# Patient Record
Sex: FEMALE | Race: WHITE | Employment: FULL TIME | URBAN - METROPOLITAN AREA
[De-identification: names, ages, dates, MRNs, and addresses within clinical notes are randomized per-mention and may not be internally consistent; named-entity substitution may affect disease eponyms.]

---
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
## 2021-01-12 PROCEDURE — 99284 EMERGENCY DEPT VISIT MOD MDM: CPT

## 2021-01-12 PROCEDURE — 96372 THER/PROPH/DIAG INJ SC/IM: CPT

## 2021-01-12 RX ORDER — KETOROLAC TROMETHAMINE 30 MG/ML
15 INJECTION, SOLUTION INTRAMUSCULAR; INTRAVENOUS ONCE
Status: COMPLETED | OUTPATIENT
Start: 2021-01-12 | End: 2021-01-13

## 2021-01-12 RX ORDER — HYDROXYZINE PAMOATE 25 MG/1
50 CAPSULE ORAL ONCE
Status: COMPLETED | OUTPATIENT
Start: 2021-01-12 | End: 2021-01-13

## 2021-01-12 ASSESSMENT — PAIN SCALES - GENERAL: PAINLEVEL_OUTOF10: 7

## 2021-01-13 ENCOUNTER — HOSPITAL ENCOUNTER (EMERGENCY)
Age: 21
Discharge: HOME | End: 2021-01-13
Payer: COMMERCIAL

## 2021-01-13 VITALS
TEMPERATURE: 97.3 F | SYSTOLIC BLOOD PRESSURE: 124 MMHG | RESPIRATION RATE: 18 BRPM | DIASTOLIC BLOOD PRESSURE: 62 MMHG | HEART RATE: 92 BPM | OXYGEN SATURATION: 98 %

## 2021-01-13 DIAGNOSIS — R52 BODY ACHES: Primary | ICD-10-CM

## 2021-01-13 PROCEDURE — 6370000000 HC RX 637 (ALT 250 FOR IP): Performed by: EMERGENCY MEDICINE

## 2021-01-13 PROCEDURE — 6360000002 HC RX W HCPCS: Performed by: EMERGENCY MEDICINE

## 2021-01-13 RX ORDER — KETOROLAC TROMETHAMINE 10 MG/1
10 TABLET, FILM COATED ORAL EVERY 6 HOURS PRN
Qty: 20 TABLET | Refills: 0 | OUTPATIENT
Start: 2021-01-13 | End: 2021-09-11

## 2021-01-13 RX ORDER — HYDROXYZINE PAMOATE 25 MG/1
25 CAPSULE ORAL DAILY
Qty: 20 CAPSULE | Refills: 0 | Status: SHIPPED | OUTPATIENT
Start: 2021-01-13 | End: 2021-01-27

## 2021-01-13 RX ADMIN — KETOROLAC TROMETHAMINE 15 MG: 30 INJECTION, SOLUTION INTRAMUSCULAR; INTRAVENOUS at 00:43

## 2021-01-13 RX ADMIN — HYDROXYZINE PAMOATE 50 MG: 25 CAPSULE ORAL at 00:43

## 2021-01-13 ASSESSMENT — PAIN SCALES - GENERAL: PAINLEVEL_OUTOF10: 7

## 2021-01-13 NOTE — LETTER
[facility] Emergency Department  [address] [id number]  Phone: [phone]  Fax: [phone]               January 13, 2021    Patient: Rylnad Garcia   YOB: 2000   Date of Visit: 1/12/2021       To Whom It May Concern:    London Conway was seen and treated in our emergency department on 1/12/2021. She may return to work on 1/15/2021.       Sincerely,               Signature:__________________________________
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
## 2021-01-29 ENCOUNTER — HOSPITAL ENCOUNTER (EMERGENCY)
Age: 21
Discharge: HOME | End: 2021-01-29
Payer: COMMERCIAL

## 2021-01-29 VITALS
DIASTOLIC BLOOD PRESSURE: 78 MMHG | WEIGHT: 293 LBS | SYSTOLIC BLOOD PRESSURE: 127 MMHG | RESPIRATION RATE: 24 BRPM | HEART RATE: 77 BPM | BODY MASS INDEX: 50.02 KG/M2 | OXYGEN SATURATION: 95 % | HEIGHT: 64 IN | TEMPERATURE: 97.8 F

## 2021-01-29 DIAGNOSIS — R51.9 NONINTRACTABLE HEADACHE, UNSPECIFIED CHRONICITY PATTERN, UNSPECIFIED HEADACHE TYPE: Primary | ICD-10-CM

## 2021-01-29 DIAGNOSIS — Z04.9 SUSPECTED CONDITION NOT FOUND: ICD-10-CM

## 2021-01-29 LAB
ALBUMIN SERPL-MCNC: 4.3 GM/DL (ref 3.4–5)
ALP BLD-CCNC: 64 IU/L (ref 40–129)
ALT SERPL-CCNC: 35 U/L (ref 10–40)
ANION GAP SERPL CALCULATED.3IONS-SCNC: 10 MMOL/L (ref 4–16)
AST SERPL-CCNC: 18 IU/L (ref 15–37)
BACTERIA: NEGATIVE /HPF
BASOPHILS ABSOLUTE: 0 K/CU MM
BASOPHILS RELATIVE PERCENT: 0.4 % (ref 0–1)
BILIRUB SERPL-MCNC: 0.5 MG/DL (ref 0–1)
BILIRUBIN URINE: NEGATIVE MG/DL
BLOOD, URINE: NEGATIVE
BUN BLDV-MCNC: 9 MG/DL (ref 6–23)
CALCIUM SERPL-MCNC: 9.3 MG/DL (ref 8.3–10.6)
CHLORIDE BLD-SCNC: 101 MMOL/L (ref 99–110)
CLARITY: ABNORMAL
CO2: 26 MMOL/L (ref 21–32)
COLOR: ABNORMAL
CREAT SERPL-MCNC: 0.8 MG/DL (ref 0.6–1.1)
DIFFERENTIAL TYPE: ABNORMAL
EOSINOPHILS ABSOLUTE: 0.2 K/CU MM
EOSINOPHILS RELATIVE PERCENT: 2.1 % (ref 0–3)
GFR AFRICAN AMERICAN: >60 ML/MIN/1.73M2
GFR NON-AFRICAN AMERICAN: >60 ML/MIN/1.73M2
GLUCOSE BLD-MCNC: 155 MG/DL (ref 70–99)
GLUCOSE BLD-MCNC: 95 MG/DL (ref 70–99)
GLUCOSE, URINE: NEGATIVE MG/DL
HCG QUALITATIVE: NEGATIVE
HCT VFR BLD CALC: 45 % (ref 37–47)
HEMOGLOBIN: 14.1 GM/DL (ref 12.5–16)
IMMATURE NEUTROPHIL %: 0.1 % (ref 0–0.43)
KETONES, URINE: NEGATIVE MG/DL
LEUKOCYTE ESTERASE, URINE: ABNORMAL
LYMPHOCYTES ABSOLUTE: 3.7 K/CU MM
LYMPHOCYTES RELATIVE PERCENT: 36.9 % (ref 25–45)
MCH RBC QN AUTO: 27.5 PG (ref 27–31)
MCHC RBC AUTO-ENTMCNC: 31.3 % (ref 32–36)
MCV RBC AUTO: 87.7 FL (ref 78–100)
MONOCYTES ABSOLUTE: 0.7 K/CU MM
MONOCYTES RELATIVE PERCENT: 7.2 % (ref 0–4)
MUCUS: ABNORMAL HPF
NITRITE URINE, QUANTITATIVE: NEGATIVE
NUCLEATED RBC %: 0 %
PDW BLD-RTO: 13.5 % (ref 11.7–14.9)
PH, URINE: 6 (ref 5–8)
PLATELET # BLD: 267 K/CU MM (ref 140–440)
PMV BLD AUTO: 12.3 FL (ref 7.5–11.1)
POTASSIUM SERPL-SCNC: 3.8 MMOL/L (ref 3.5–5.1)
PROTEIN UA: 100 MG/DL
RBC # BLD: 5.13 M/CU MM (ref 4.2–5.4)
RBC URINE: 6 /HPF (ref 0–6)
SEGMENTED NEUTROPHILS ABSOLUTE COUNT: 5.3 K/CU MM
SEGMENTED NEUTROPHILS RELATIVE PERCENT: 53.3 % (ref 34–64)
SODIUM BLD-SCNC: 137 MMOL/L (ref 135–145)
SPECIFIC GRAVITY UA: 1.03 (ref 1–1.03)
SQUAMOUS EPITHELIAL: 94 /HPF
TOTAL IMMATURE NEUTOROPHIL: 0.01 K/CU MM
TOTAL NUCLEATED RBC: 0 K/CU MM
TOTAL PROTEIN: 7.2 GM/DL (ref 6.4–8.2)
TRICHOMONAS: ABNORMAL /HPF
UROBILINOGEN, URINE: NORMAL MG/DL (ref 0.2–1)
WBC # BLD: 10 K/CU MM (ref 4–10.5)
WBC UA: 70 /HPF (ref 0–5)

## 2021-01-29 PROCEDURE — 82962 GLUCOSE BLOOD TEST: CPT

## 2021-01-29 PROCEDURE — 96375 TX/PRO/DX INJ NEW DRUG ADDON: CPT

## 2021-01-29 PROCEDURE — 80053 COMPREHEN METABOLIC PANEL: CPT

## 2021-01-29 PROCEDURE — 85025 COMPLETE CBC W/AUTO DIFF WBC: CPT

## 2021-01-29 PROCEDURE — 87086 URINE CULTURE/COLONY COUNT: CPT

## 2021-01-29 PROCEDURE — 84703 CHORIONIC GONADOTROPIN ASSAY: CPT

## 2021-01-29 PROCEDURE — 99284 EMERGENCY DEPT VISIT MOD MDM: CPT

## 2021-01-29 PROCEDURE — 81001 URINALYSIS AUTO W/SCOPE: CPT

## 2021-01-29 PROCEDURE — 6360000002 HC RX W HCPCS: Performed by: PHYSICIAN ASSISTANT

## 2021-01-29 PROCEDURE — 96374 THER/PROPH/DIAG INJ IV PUSH: CPT

## 2021-01-29 PROCEDURE — 2580000003 HC RX 258: Performed by: PHYSICIAN ASSISTANT

## 2021-01-29 RX ORDER — DIPHENHYDRAMINE HYDROCHLORIDE 50 MG/ML
50 INJECTION INTRAMUSCULAR; INTRAVENOUS ONCE
Status: COMPLETED | OUTPATIENT
Start: 2021-01-29 | End: 2021-01-29

## 2021-01-29 RX ORDER — METOCLOPRAMIDE HYDROCHLORIDE 5 MG/ML
10 INJECTION INTRAMUSCULAR; INTRAVENOUS ONCE
Status: COMPLETED | OUTPATIENT
Start: 2021-01-29 | End: 2021-01-29

## 2021-01-29 RX ORDER — 0.9 % SODIUM CHLORIDE 0.9 %
1000 INTRAVENOUS SOLUTION INTRAVENOUS ONCE
Status: COMPLETED | OUTPATIENT
Start: 2021-01-29 | End: 2021-01-29

## 2021-01-29 RX ADMIN — METOCLOPRAMIDE 10 MG: 5 INJECTION, SOLUTION INTRAMUSCULAR; INTRAVENOUS at 20:39

## 2021-01-29 RX ADMIN — SODIUM CHLORIDE 1000 ML: 9 INJECTION, SOLUTION INTRAVENOUS at 20:39

## 2021-01-29 RX ADMIN — DIPHENHYDRAMINE HYDROCHLORIDE 50 MG: 50 INJECTION INTRAMUSCULAR; INTRAVENOUS at 20:40

## 2021-01-30 NOTE — ED NOTES
Pt. Presents to the ER with lightheadness, dizziness, and blurry vision. Pt. States she is not diabetic and has never had anything like this happen before.      [facility]  01/29/21 1947

## 2021-01-30 NOTE — ED PROVIDER NOTES
Emergency [address] EMERGENCY DEPARTMENT    Patient: Debbie Livingston  MRN: [id number]  : 2000  Date of Evaluation: 2021  ED Provider: Dimas Ramirez PA-C    Chief Complaint       Chief Complaint   Patient presents with    Hyperglycemia       Maurice Perez is a 24 y.o. female who presents to the emergency department for a headache, lightheadedness, blurred vision. Onset was this morning. Constant since onset. Headache is generalized, gradual onset, not thunderclap, not worst headache of life. She denies any nausea or vomiting. Denies any urinary symptoms. She had gone to Urgent Care and was told their glucometer read \"error-high\" and that they couldn't do anything for her. She states she went home and talked to her dad who is a diabetic and asked him what he would do and he suggested coming here because her sugar could be >500. She has no personal history of DM. Patient is asking if she's ready to leave upon initial exam.    ROS     CONSTITUTIONAL:  Denies fever. EYES:  Denies visual changes. HEAD:  +  headache. ENT:  Denies earache, nasal congestion, sore throat. NECK:  Denies neck pain. RESPIRATORY:  Denies any shortness of breath. CARDIOVASCULAR:  Denies chest pain. GI:  Denies nausea or vomiting. :  Denies urinary symptoms. MUSCULOSKELETAL:  Denies extremity pain or swelling. BACK:  Denies back pain. INTEGUMENT:  Denies skin changes. LYMPHATIC:  Denies lymphadenopathy. NEUROLOGIC:  Denies any numbness/tingling. PSYCHIATRIC:  Denies SI/HI. Past History     Past Medical History:   Diagnosis Date    ADHD (attention deficit hyperactivity disorder)     ADHD (attention deficit hyperactivity disorder)     Reflux      History reviewed. No pertinent surgical history.   Social History     Socioeconomic History    Marital status: Single     Spouse name: None    Number of children: None    Years of education: None    Highest education level: None   Occupational History    None   Social Needs    Financial resource strain: None    Food insecurity     Worry: None     Inability: None    Transportation needs     Medical: None     Non-medical: None   Tobacco Use    Smoking status: Current Every Day Smoker     Packs/day: 0.50    Smokeless tobacco: Current User     Types: Chew   Substance and Sexual Activity    Alcohol use: Yes     Comment: occasional    Drug use: Yes     Types: Marijuana    Sexual activity: None   Lifestyle    Physical activity     Days per week: None     Minutes per session: None    Stress: None   Relationships    Social connections     Talks on phone: None     Gets together: None     Attends [employer] service: None     Active member of club or organization: None     Attends meetings of clubs or organizations: None     Relationship status: None    Intimate partner violence     Fear of current or ex partner: None     Emotionally abused: None     Physically abused: None     Forced sexual activity: None   Other Topics Concern    None   Social History Narrative    None       Medications/Allergies     Previous Medications    AMPHETAMINE-DEXTROAMPHETAMINE (ADDERALL) 30 MG TABLET    Take 30 mg by mouth daily. AMPHETAMINE-DEXTROAMPHETAMINE (ADDERALL, 10MG,) 10 MG TABLET    Take 10 mg by mouth daily. CETIRIZINE (ZYRTEC ALLERGY) 10 MG TABLET    Take 10 mg by mouth daily. FLUOXETINE (PROZAC) 20 MG CAPSULE    Take 40 mg by mouth daily    KETOROLAC (TORADOL) 10 MG TABLET    Take 1 tablet by mouth every 6 hours as needed for Pain    LIDOCAINE (LIDODERM) 5 %    Place 1 patch onto the skin daily 12 hours on, 12 hours off. LORATADINE 10 MG CAPS    Take  by mouth. MELATONIN 3-10 MG TABS    Take  by mouth.       NAPROXEN (NAPROSYN) 500 MG TABLET    Take 1 tablet by mouth 2 times daily    ONDANSETRON (ZOFRAN) 4 MG TABLET    Take 1 tablet by mouth every 8 hours as needed for Nausea SULFAMETHOXAZOLE-TRIMETHOPRIM (BACTRIM DS) 800-160 MG PER TABLET    2 tabs by mouth twice a day x10 days. #40     Allergies   Allergen Reactions    Midol [Acetaminophen] Shortness Of Breath and Rash    Pollen Extract Anaphylaxis     sneezing    Cefazolin         Physical Exam       ED Triage Vitals [01/29/21 1905]   BP Temp Temp Source Heart Rate Resp SpO2 Height Weight - Scale   (!) 132/99 97.8 °F (36.6 °C) Oral (!) 110 16 96 % 5' 4\" (1.626 m) (!) 324 lb (147 kg)     GENERAL APPEARANCE:  Well-developed, well-nourished, no acute distress. HEAD:  NC/AT. EYES:  Sclera anicteric. ENT:  Ears, nose, mouth normal.     NECK:  Supple. CARDIO:  RRR. LUNGS:   CTAB. Respirations unlabored. ABDOMEN:  Soft, non-distended, non-tender. BS active. EXTREMITIES:  No acute deformities. SKIN:  Warm and dry. NEUROLOGICAL:  Alert and oriented. PSYCHIATRIC:  Normal mood. Diagnostics     Labs:    Labs Reviewed   CBC WITH AUTO DIFFERENTIAL - Abnormal; Notable for the following components:       Result Value    MCHC 31.3 (*)     MPV 12.3 (*)     Monocytes % 7.2 (*)     All other components within normal limits   URINALYSIS - Abnormal; Notable for the following components:    Color, UA HARRY (*)     Clarity, UA CLOUDY (*)     Protein,  (*)     Leukocyte Esterase, Urine MODERATE (*)     WBC, UA 70 (*)     Mucus, UA MANY (*)     All other components within normal limits   POCT GLUCOSE - Abnormal; Notable for the following components:    POC Glucose 155 (*)     All other components within normal limits   CULTURE, URINE   COMPREHENSIVE METABOLIC PANEL   HCG, SERUM, QUALITATIVE       ED Course and MDM   -  Patient seen and evaluated in the emergency department. -  Triage and nursing notes reviewed and incorporated. -  Old chart records reviewed and incorporated. -  Supervising physician was Dr. Son Samuels. Patient was seen independently.   -  Work-up included:  See above  -  ED medications:  NS, Reglan, Benadryl  -

## 2021-01-31 LAB
CULTURE: NORMAL
Lab: NORMAL
SPECIMEN: NORMAL

## 2021-07-16 ENCOUNTER — APPOINTMENT (OUTPATIENT)
Dept: GENERAL RADIOLOGY | Age: 21
End: 2021-07-16
Payer: COMMERCIAL

## 2021-07-16 ENCOUNTER — HOSPITAL ENCOUNTER (EMERGENCY)
Age: 21
Discharge: HOME | End: 2021-07-16
Payer: COMMERCIAL

## 2021-07-16 VITALS
BODY MASS INDEX: 50.02 KG/M2 | TEMPERATURE: 98.7 F | WEIGHT: 293 LBS | DIASTOLIC BLOOD PRESSURE: 75 MMHG | HEIGHT: 64 IN | HEART RATE: 111 BPM | OXYGEN SATURATION: 98 % | SYSTOLIC BLOOD PRESSURE: 126 MMHG | RESPIRATION RATE: 18 BRPM

## 2021-07-16 DIAGNOSIS — F17.200 SMOKER: ICD-10-CM

## 2021-07-16 DIAGNOSIS — J45.41 MODERATE PERSISTENT ASTHMA WITH EXACERBATION: Primary | ICD-10-CM

## 2021-07-16 PROCEDURE — 6370000000 HC RX 637 (ALT 250 FOR IP): Performed by: PHYSICIAN ASSISTANT

## 2021-07-16 PROCEDURE — 94640 AIRWAY INHALATION TREATMENT: CPT

## 2021-07-16 PROCEDURE — 99283 EMERGENCY DEPT VISIT LOW MDM: CPT

## 2021-07-16 PROCEDURE — 6360000002 HC RX W HCPCS: Performed by: PHYSICIAN ASSISTANT

## 2021-07-16 PROCEDURE — 71045 X-RAY EXAM CHEST 1 VIEW: CPT

## 2021-07-16 RX ORDER — IPRATROPIUM BROMIDE AND ALBUTEROL SULFATE 2.5; .5 MG/3ML; MG/3ML
1 SOLUTION RESPIRATORY (INHALATION) ONCE
Status: COMPLETED | OUTPATIENT
Start: 2021-07-16 | End: 2021-07-16

## 2021-07-16 RX ORDER — ALBUTEROL SULFATE 90 UG/1
1-2 AEROSOL, METERED RESPIRATORY (INHALATION) EVERY 6 HOURS PRN
Qty: 1 INHALER | Refills: 0 | Status: SHIPPED | OUTPATIENT
Start: 2021-07-16 | End: 2021-09-11

## 2021-07-16 RX ORDER — ALBUTEROL SULFATE 2.5 MG/3ML
2.5 SOLUTION RESPIRATORY (INHALATION) ONCE
Status: COMPLETED | OUTPATIENT
Start: 2021-07-16 | End: 2021-07-16

## 2021-07-16 RX ORDER — PREDNISONE 10 MG/1
40 TABLET ORAL DAILY
Qty: 20 TABLET | Refills: 0 | Status: SHIPPED | OUTPATIENT
Start: 2021-07-16 | End: 2021-07-21

## 2021-07-16 RX ADMIN — PREDNISONE 30 MG: 20 TABLET ORAL at 19:08

## 2021-07-16 RX ADMIN — ALBUTEROL SULFATE 2.5 MG: 2.5 SOLUTION RESPIRATORY (INHALATION) at 19:04

## 2021-07-16 RX ADMIN — IPRATROPIUM BROMIDE AND ALBUTEROL SULFATE 1 AMPULE: .5; 3 SOLUTION RESPIRATORY (INHALATION) at 19:04

## 2021-07-16 RX ADMIN — IPRATROPIUM BROMIDE AND ALBUTEROL SULFATE 1 AMPULE: .5; 2.5 SOLUTION RESPIRATORY (INHALATION) at 18:31

## 2021-07-16 RX ADMIN — ALBUTEROL SULFATE 2.5 MG: 2.5 SOLUTION RESPIRATORY (INHALATION) at 18:31

## 2021-07-16 ASSESSMENT — PAIN SCALES - GENERAL: PAINLEVEL_OUTOF10: 8

## 2021-07-16 ASSESSMENT — PAIN DESCRIPTION - PAIN TYPE: TYPE: ACUTE PAIN

## 2021-07-16 ASSESSMENT — PAIN DESCRIPTION - LOCATION: LOCATION: CHEST

## 2021-07-16 NOTE — ED PROVIDER NOTES
[address] ENCOUNTER        Pt Name: Noble Infante  MRN: [id number]  [place] 2000  Date of evaluation: 7/16/2021  Provider: Brandy Graf PA-C  PCP: Sabina Patel MD  Note Started: 6:25 PM EDT       DEISY. I have evaluated this patient. My supervising physician was available for consultation. Sharyn Kim MD      CHIEF COMPLAINT       Chief Complaint   Patient presents with    Cough     states she startes have cough and congestion approx. 2 days ago. denies fever. HISTORY OF PRESENT ILLNESS   (Location, Timing/Onset, Context/Setting, Quality, Duration, Modifying Factors, Severity, Associated Signs and Symptoms)  Note limiting factors. Chief Complaint: Shortness of breath, cough    Noble Infante is a 24 y.o. female who presents presents with wheezing, shortness of breath and cough. Patient has history of asthma. She has not seen a healthcare provider in years. She does not recall using HFA in the past.  Patient has no known seasonal allergies. The patient does report symptoms beginning about 3 days ago worse over the past 48 hours. Worse today. She comes in with audible wheezing and a wheezy cough. The patient does smoke times years. Typically 1 pack/day. She is unable to smoke for the past couple of days. Denies fevers, chills, sputum production. No gastrointestinal or urinary complaints. Nursing Notes were all reviewed and agreed with or any disagreements were addressed in the HPI. REVIEW OF SYSTEMS    (2-9 systems for level 4, 10 or more for level 5)     Review of Systems    Positives and Pertinent negatives as per HPI. Except as noted above in the ROS, all other systems were reviewed and negative.        PAST MEDICAL HISTORY     Past Medical History:   Diagnosis Date    ADHD (attention deficit hyperactivity disorder)     ADHD (attention deficit hyperactivity disorder)     Head: Normocephalic and atraumatic. Right Ear: Tympanic membrane, ear canal and external ear normal.      Left Ear: Tympanic membrane, ear canal and external ear normal.      Nose: Congestion present. Mouth/Throat:      Mouth: Mucous membranes are moist.      Pharynx: Oropharynx is clear. Eyes:      General: No scleral icterus. Right eye: No discharge. Left eye: No discharge. Conjunctiva/sclera: Conjunctivae normal.   Cardiovascular:      Rate and Rhythm: Regular rhythm. Tachycardia present. Heart sounds: Normal heart sounds. Pulmonary:      Effort: Pulmonary effort is normal.      Breath sounds: Wheezing present. Musculoskeletal:         General: Normal range of motion. Cervical back: Normal range of motion and neck supple. Skin:     General: Skin is warm and dry. Neurological:      General: No focal deficit present. Mental Status: She is alert and oriented to person, place, and time. Mental status is at baseline. Psychiatric:         Mood and Affect: Mood normal.         Behavior: Behavior normal.         Thought Content: Thought content normal.         Judgment: Judgment normal.         DIAGNOSTIC RESULTS   LABS:    Labs Reviewed - No data to display    When ordered only abnormal lab results are displayed. All other labs were within normal range or not returned as of this dictation. EKG: When ordered, EKG's are interpreted by the Emergency Department Physician in the absence of a cardiologist.  Please see their note for interpretation of EKG. RADIOLOGY:   Non-plain film images such as CT, Ultrasound and MRI are read by the radiologist. Plain radiographic images are visualized and preliminarily interpreted by the ED Provider with the below findings:        Interpretation per the Radiologist below, if available at the time of this note:    XR CHEST PORTABLE   Final Result   No acute process.            XR CHEST PORTABLE    Result Date: discharge. I did write for a Ventolin inhaler. I did recommend she decrease smoking by 50% for least 1 to 2 weeks and then discontinue smoking. I have placed referral to family practice service. The patient did express understanding the diagnosis and the treatment plan. FINAL IMPRESSION      1. Moderate persistent asthma with exacerbation    2. Smoker          DISPOSITION/PLAN   DISPOSITION Decision To Discharge 07/16/2021 07:09:58 PM      PATIENT REFERRED TO:  [facility]  [address]  [address] [facility] [address]  [phone]  Schedule an appointment as soon as possible for a visit on 7/21/2021      [facility] - ADULT  [address]  [phone]  Schedule an appointment as soon as possible for a visit on 7/21/2021      [facility] Emergency Department  [address] [facility] [id number]  [phone]  Go to   If symptoms worsen      DISCHARGE MEDICATIONS:  New Prescriptions    ALBUTEROL SULFATE  (90 BASE) MCG/ACT INHALER    Inhale 1-2 puffs into the lungs every 6 hours as needed for Wheezing    PREDNISONE (DELTASONE) 10 MG TABLET    Take 4 tablets by mouth daily for 5 doses       DISCONTINUED MEDICATIONS:  Discontinued Medications    No medications on file              (Please note that portions of this note were completed with a voice recognition program.  Efforts were made to edit the dictations but occasionally words are mis-transcribed. )    Ihsan Parker PA-C (electronically signed)           Ihsan Parker PA-C  07/16/21 [id number]

## 2021-08-02 ENCOUNTER — HOSPITAL ENCOUNTER (EMERGENCY)
Age: 21
Discharge: HOME | End: 2021-08-02
Payer: COMMERCIAL

## 2021-08-02 VITALS
DIASTOLIC BLOOD PRESSURE: 59 MMHG | WEIGHT: 293 LBS | BODY MASS INDEX: 50.02 KG/M2 | TEMPERATURE: 99.4 F | SYSTOLIC BLOOD PRESSURE: 109 MMHG | HEART RATE: 101 BPM | HEIGHT: 64 IN | RESPIRATION RATE: 18 BRPM | OXYGEN SATURATION: 97 %

## 2021-08-02 DIAGNOSIS — Z11.52 ENCOUNTER FOR SCREENING FOR COVID-19: Primary | ICD-10-CM

## 2021-08-02 PROCEDURE — U0003 INFECTIOUS AGENT DETECTION BY NUCLEIC ACID (DNA OR RNA); SEVERE ACUTE RESPIRATORY SYNDROME CORONAVIRUS 2 (SARS-COV-2) (CORONAVIRUS DISEASE [COVID-19]), AMPLIFIED PROBE TECHNIQUE, MAKING USE OF HIGH THROUGHPUT TECHNOLOGIES AS DESCRIBED BY CMS-2020-01-R: HCPCS

## 2021-08-02 PROCEDURE — 99283 EMERGENCY DEPT VISIT LOW MDM: CPT

## 2021-08-02 PROCEDURE — U0005 INFEC AGEN DETEC AMPLI PROBE: HCPCS

## 2021-08-02 PROCEDURE — 6370000000 HC RX 637 (ALT 250 FOR IP): Performed by: PHYSICIAN ASSISTANT

## 2021-08-02 RX ORDER — IBUPROFEN 600 MG/1
600 TABLET ORAL ONCE
Status: COMPLETED | OUTPATIENT
Start: 2021-08-02 | End: 2021-08-02

## 2021-08-02 RX ADMIN — IBUPROFEN 600 MG: 600 TABLET ORAL at 21:24

## 2021-08-02 ASSESSMENT — PAIN DESCRIPTION - PAIN TYPE: TYPE: ACUTE PAIN

## 2021-08-02 ASSESSMENT — PAIN DESCRIPTION - LOCATION: LOCATION: HEAD

## 2021-08-02 ASSESSMENT — PAIN SCALES - GENERAL
PAINLEVEL_OUTOF10: 9
PAINLEVEL_OUTOF10: 7

## 2021-08-03 ENCOUNTER — CARE COORDINATION (OUTPATIENT)
Dept: CARE COORDINATION | Age: 21
End: 2021-08-03

## 2021-08-03 LAB
SARS-COV-2: NOT DETECTED
SOURCE: NORMAL

## 2021-08-03 NOTE — ED PROVIDER NOTES
EMERGENCY DEPARTMENT ENCOUNTER      PCP: Hector Hunt MD    CHIEF COMPLAINT    Chief Complaint   Patient presents with    Concern For COVID-19     This patient was not evaluated by the attending physician. I have independently evaluated this patient. My supervising physician was available for consultation. HPI    Bernard Esteban is a 24 y.o. female who presents to the emergency department today concerned about an exposure to COVID-19. States that she has had a mild headache with a throwing symptom. She states her exposure was 3 days ago that person has and has since tested positive. No chest pain or shortness of breath, no significant fevers. No congestion. States that employer wants a Covid test prior to returning back to work. Patient denies lower extremity swelling, recent long travel, history of PE/DVT, exogenous hormone use, recent surgeries/hospitalizations, recent trauma, hemoptysis. Patient denies fever, chills, nasal congestion, rhinorrhea, sore throat, ear pain, eye pain, drainage from ears or eyes, difficulty swallowing, shortness of breath, wheezing, hemoptysis, chest pain. REVIEW OF SYSTEMS    Constitutional:  no fever, chills  HEENT:  See HPI  Cardiovascular:  Denies chest pain, palpitations. Respiratory:  See HPI  GI:  Denies abdominal pain, vomiting, or diarrhea   Neurologic:  Denies confusion, light headedness, dizziness, or syncope   Skin:  No rash  All other review of systems are negative  See HPI and nursing notes for additional information     PAST MEDICAL AND SURGICAL HISTORY    Past Medical History:   Diagnosis Date    ADHD (attention deficit hyperactivity disorder)     ADHD (attention deficit hyperactivity disorder)     Reflux      History reviewed. No pertinent surgical history.     CURRENT MEDICATIONS    Current Outpatient Rx   Medication Sig Dispense Refill    albuterol sulfate  (90 Base) MCG/ACT inhaler Inhale 1-2 puffs into the lungs every 6 hours as needed for Wheezing 1 Inhaler 0    ketorolac (TORADOL) 10 MG tablet Take 1 tablet by mouth every 6 hours as needed for Pain 20 tablet 0    lidocaine (LIDODERM) 5 % Place 1 patch onto the skin daily 12 hours on, 12 hours off. 30 patch 0    naproxen (NAPROSYN) 500 MG tablet Take 1 tablet by mouth 2 times daily 60 tablet 0    ondansetron (ZOFRAN) 4 MG tablet Take 1 tablet by mouth every 8 hours as needed for Nausea 10 tablet 0    sulfamethoxazole-trimethoprim (BACTRIM DS) 800-160 MG per tablet 2 tabs by mouth twice a day x10 days. #40 40 tablet 0    FLUoxetine (PROZAC) 20 MG capsule Take 40 mg by mouth daily      Loratadine 10 MG CAPS Take  by mouth.  cetirizine (ZYRTEC ALLERGY) 10 MG tablet Take 10 mg by mouth daily.  amphetamine-dextroamphetamine (ADDERALL, 10MG,) 10 MG tablet Take 10 mg by mouth daily.  amphetamine-dextroamphetamine (ADDERALL) 30 MG tablet Take 30 mg by mouth daily.  Melatonin 3-10 MG TABS Take  by mouth. ALLERGIES    Allergies   Allergen Reactions    Midol [Acetaminophen] Shortness Of Breath and Rash    Pollen Extract Anaphylaxis     sneezing    Cefazolin        FAMILY AND SOCIAL HISTORY    History reviewed. No pertinent family history.   Social History     Socioeconomic History    Marital status: Single     Spouse name: None    Number of children: None    Years of education: None    Highest education level: None   Occupational History    None   Tobacco Use    Smoking status: Current Every Day Smoker     Packs/day: 0.50    Smokeless tobacco: Current User     Types: Chew   Substance and Sexual Activity    Alcohol use: Yes     Comment: occasional    Drug use: Yes     Types: Marijuana    Sexual activity: None   Other Topics Concern    None   Social History Narrative    None     Social Determinants of Health     Financial Resource Strain:     Difficulty of Paying Living Expenses:    Food Insecurity:     Worried About Running Out of Food in the Last Year:    [address] in the Last Year:    Transportation Needs:     Lack of Transportation (Medical):  Lack of Transportation (Non-Medical):    Physical Activity:     Days of Exercise per Week:     Minutes of Exercise per Session:    Stress:     Feeling of Stress :    Social Connections:     Frequency of Communication with Friends and Family:     Frequency of Social Gatherings with Friends and Family:     Attends Anabaptism Services:     Active Member of Clubs or Organizations:     Attends Club or Organization Meetings:     Marital Status:    Intimate Partner Violence:     Fear of Current or Ex-Partner:     Emotionally Abused:     Physically Abused:     Sexually Abused:      PHYSICAL EXAM    VITAL SIGNS: BP (!) 109/59   Pulse 101   Temp 99.4 °F (37.4 °C) (Oral)   Resp 18   Ht 5' 4\" (1.626 m)   Wt 300 lb (136.1 kg)   SpO2 97%   BMI 51.49 kg/m²   Constitutional:  Well developed, well nourished, no acute distress, non-toxic appearance   Eyes: Conjunctiva normal, sclera non-icteric  HEENT:     - Normocephalic, atraumatic   - EOM intact. Conjunctiva normal    - External ears and nose normal.   - External auditory canals clear   - TMs clear without discharge, fluid, or bulging.   - Nasal passages with mildly erythematous and edematous turbinates. No masses. - Oropharynx mildly erythematous without tonsillar hypertrophy or exudate. Uvula is midline and rises evenly with phonation. Neck/Lymphatics:    - supple, no JVD, no swollen nodes  Respiratory:    Clear to auscultation in bilateral lung fields, no wheezes/rales/rhonchi. No retractions, no accessory muscle use  Cardiovascular:  Normal rate, normal rhythm   GI:  Soft, no abdominal tenderness, no guarding. Musculoskeletal:  No obvious deficits, no edema   Integument:  Skin pink, warm, dry, intact     LABS:  No results found for this visit on 08/02/21.       RADIOLOGY/PROCEDURES    No orders to display     ED COURSE & MEDICAL DECISION MAKING      Vital signs and nursing notes reviewed during ED course. I have independently evaluated this patient. Supervising physician present in the Emergency Department, available for consultation, throughout entirety of  patient care. All pertinent Lab data and radiographic results reviewed with patient at bedside. Patient presents as above which prompted work up today. I did don/doff appropriate PPE as recommended by the health facility/national standard best practice, during my bedside interactions with the patient. I discussed the likely viral etiology of patient's symptoms with patient today and recommend symptomatic treatment with increase fluids and rest. I considered the possibility of COVID19 in light of the current available information and COVID19 testing in progress. I have explained to the patient that their condition may change rapidly and have given them strict return precautions. In addition, they are given instructions regarding appropriate symptomatic care at home and instructions on how to minimize spread of the infection. Out of an abundance of caution I told patient and household members to self quarantine, and patient is to self-isolate until COVID-19 testing results and is negative, or if positive until cleared by local health department. Patient should also self isolate and self quarantine until without fever for 24 hours without use of antipyretics, and be greater than 7 days from onset of illness. Patient is stable for outpatient management. The patient and/or the family were informed of the results of any tests/labs/imaging, the treatment plan, and time was allotted to answer questions. Diagnosis, disposition, and plan discussed in detail with patient who understands and agrees. Patient understands and agrees to follow up with PCP for recheck in 2-3 days.  Patient understands and agrees to return to emergency department if symptoms worsen or any new symptoms develop- strict return precautions given. Clinical  IMPRESSION    1. Encounter for screening for COVID-19      Disposition referral (if applicable):  [facility] Emergency Department  [address]  [phone]  In 2 days  For re- check, If symptoms worsen    Disposition medications (if applicable):  Discharge Medication List as of 8/2/2021  9:19 PM        Comment: Please note this report has been produced using speech recognition software and may contain errors related to that system including errors in grammar, punctuation, and spelling, as well as words and phrases that may be inappropriate. If there are any questions or concerns please feel free to contact the dictating provider for clarification.       Citlaly Reyna, PA  08/03/21 4291

## 2021-08-03 NOTE — CARE COORDINATION
Patient contacted regarding COVID-19 risk. Discussed COVID-19 related testing which was pending at this time. Test results were pending. Patient informed of results, if available? No.      Ambulatory Care Manager contacted the patient by telephone to perform post discharge assessment. Call within 2 business days of discharge: Yes. Verified name and  with patient as identifiers. Provided introduction to self, and explanation of the CTN/ACM role, and reason for call due to risk factors for infection and/or exposure to COVID-19. Symptoms reviewed with patient who verbalized the following symptoms: no new symptoms. Due to no new or worsening symptoms encounter was not routed to provider for escalation. Discussed follow-up appointments. If no appointment was previously scheduled, appointment scheduling offered: No.  [address] follow up appointment(s): No future appointments. Non-[facility] follow up appointment(s):     Non-face-to-face services provided:  Obtained and reviewed discharge summary and/or continuity of care documents     Advance Care Planning:   Does patient have an Advance Directive:  reviewed and current. Educated patient about risk for severe COVID-19 due to risk factors according to CDC guidelines. ACM reviewed discharge instructions, medical action plan and red flag symptoms with the patient who verbalized understanding. Discussed COVID vaccination status: Yes. Education provided on COVID-19 vaccination as appropriate. Discussed exposure protocols and quarantine with CDC Guidelines. Patient was given an opportunity to verbalize any questions and concerns and agrees to contact ACM or health care provider for questions related to their healthcare. Reviewed and educated patient on any new and changed medications related to discharge diagnosis     Was patient discharged with a pulse oximeter?  No Discussed and confirmed pulse oximeter discharge instructions and when to notify provider or seek emergency care. ACM provided contact information. No further follow-up call identified based on severity of symptoms and risk factors.

## 2021-09-11 ENCOUNTER — HOSPITAL ENCOUNTER (EMERGENCY)
Age: 21
Discharge: HOME | End: 2021-09-11
Payer: COMMERCIAL

## 2021-09-11 ENCOUNTER — APPOINTMENT (OUTPATIENT)
Dept: GENERAL RADIOLOGY | Age: 21
End: 2021-09-11
Payer: COMMERCIAL

## 2021-09-11 VITALS
TEMPERATURE: 98.7 F | OXYGEN SATURATION: 97 % | HEIGHT: 64 IN | BODY MASS INDEX: 50.02 KG/M2 | RESPIRATION RATE: 20 BRPM | WEIGHT: 293 LBS | SYSTOLIC BLOOD PRESSURE: 132 MMHG | DIASTOLIC BLOOD PRESSURE: 83 MMHG | HEART RATE: 99 BPM

## 2021-09-11 DIAGNOSIS — J20.9 BRONCHITIS, ACUTE, WITH BRONCHOSPASM: ICD-10-CM

## 2021-09-11 DIAGNOSIS — B34.0 ADENOVIRUS INFECTION: Primary | ICD-10-CM

## 2021-09-11 DIAGNOSIS — F17.200 SMOKER: ICD-10-CM

## 2021-09-11 LAB
ADENOVIRUS DETECTION BY PCR: ABNORMAL
BORDETELLA PARAPERTUSSIS BY PCR: NOT DETECTED
BORDETELLA PERTUSSIS PCR: NOT DETECTED
CHLAMYDOPHILA PNEUMONIA PCR: NOT DETECTED
CORONAVIRUS 229E PCR: NOT DETECTED
CORONAVIRUS HKU1 PCR: NOT DETECTED
CORONAVIRUS NL63 PCR: NOT DETECTED
CORONAVIRUS OC43 PCR: NOT DETECTED
EKG ATRIAL RATE: 105 BPM
EKG DIAGNOSIS: NORMAL
EKG P AXIS: 49 DEGREES
EKG P-R INTERVAL: 142 MS
EKG Q-T INTERVAL: 342 MS
EKG QRS DURATION: 74 MS
EKG QTC CALCULATION (BAZETT): 452 MS
EKG R AXIS: 28 DEGREES
EKG T AXIS: 18 DEGREES
EKG VENTRICULAR RATE: 105 BPM
HUMAN METAPNEUMOVIRUS PCR: NOT DETECTED
INFLUENZA A BY PCR: NOT DETECTED
INFLUENZA A H1 (2009) PCR: NOT DETECTED
INFLUENZA A H1 PANDEMIC PCR: NOT DETECTED
INFLUENZA A H3 PCR: NOT DETECTED
INFLUENZA B BY PCR: NOT DETECTED
MYCOPLASMA PNEUMONIAE PCR: NOT DETECTED
PARAINFLUENZA 1 PCR: NOT DETECTED
PARAINFLUENZA 2 PCR: NOT DETECTED
PARAINFLUENZA 3 PCR: NOT DETECTED
PARAINFLUENZA 4 PCR: NOT DETECTED
RHINOVIRUS ENTEROVIRUS PCR: NOT DETECTED
RSV PCR: NOT DETECTED
SARS-COV-2: NOT DETECTED

## 2021-09-11 PROCEDURE — 94640 AIRWAY INHALATION TREATMENT: CPT

## 2021-09-11 PROCEDURE — 93005 ELECTROCARDIOGRAM TRACING: CPT | Performed by: EMERGENCY MEDICINE

## 2021-09-11 PROCEDURE — 6370000000 HC RX 637 (ALT 250 FOR IP): Performed by: PHYSICIAN ASSISTANT

## 2021-09-11 PROCEDURE — 93010 ELECTROCARDIOGRAM REPORT: CPT | Performed by: INTERNAL MEDICINE

## 2021-09-11 PROCEDURE — 94664 DEMO&/EVAL PT USE INHALER: CPT

## 2021-09-11 PROCEDURE — 99285 EMERGENCY DEPT VISIT HI MDM: CPT

## 2021-09-11 PROCEDURE — 71045 X-RAY EXAM CHEST 1 VIEW: CPT

## 2021-09-11 PROCEDURE — 0202U NFCT DS 22 TRGT SARS-COV-2: CPT

## 2021-09-11 RX ORDER — DEXTROMETHORPHAN HYDROBROMIDE AND PROMETHAZINE HYDROCHLORIDE 15; 6.25 MG/5ML; MG/5ML
5 SYRUP ORAL EVERY 6 HOURS PRN
Qty: 120 ML | Refills: 0 | Status: SHIPPED | OUTPATIENT
Start: 2021-09-11 | End: 2021-09-18

## 2021-09-11 RX ORDER — GUAIFENESIN/DEXTROMETHORPHAN 100-10MG/5
10 SYRUP ORAL ONCE
Status: COMPLETED | OUTPATIENT
Start: 2021-09-11 | End: 2021-09-11

## 2021-09-11 RX ORDER — ALBUTEROL SULFATE 90 UG/1
2 AEROSOL, METERED RESPIRATORY (INHALATION) ONCE
Status: COMPLETED | OUTPATIENT
Start: 2021-09-11 | End: 2021-09-11

## 2021-09-11 RX ORDER — GUAIFENESIN 600 MG/1
600 TABLET, EXTENDED RELEASE ORAL 2 TIMES DAILY
Qty: 30 TABLET | Refills: 0 | Status: SHIPPED | OUTPATIENT
Start: 2021-09-11 | End: 2021-09-26

## 2021-09-11 RX ORDER — ALBUTEROL SULFATE 90 UG/1
1-2 AEROSOL, METERED RESPIRATORY (INHALATION) EVERY 6 HOURS PRN
Qty: 18 G | Refills: 0 | Status: SHIPPED | OUTPATIENT
Start: 2021-09-11

## 2021-09-11 RX ORDER — PREDNISONE 10 MG/1
20 TABLET ORAL DAILY
Qty: 10 TABLET | Refills: 0 | Status: SHIPPED | OUTPATIENT
Start: 2021-09-11 | End: 2021-09-16

## 2021-09-11 RX ADMIN — GUAIFENESIN AND DEXTROMETHORPHAN 10 ML: 100; 10 SYRUP ORAL at 13:07

## 2021-09-11 RX ADMIN — Medication 2 PUFF: at 12:56

## 2021-09-11 RX ADMIN — ALBUTEROL SULFATE 2 PUFF: 90 AEROSOL, METERED RESPIRATORY (INHALATION) at 12:54

## 2021-09-11 NOTE — ED PROVIDER NOTES
[address] ENCOUNTER        Pt Name: Gaudencio Diaz  MRN: [id number]  [place] 2000  Date of evaluation: 9/11/2021  Provider: Ed Lovell PA-C  PCP: Brooklynn Mcduffie MD  Note Started: 10:42 AM EDT       DEISY. I have evaluated this patient. My supervising physician was available for consultation. Ruth Ford DO    CHIEF COMPLAINT       Chief Complaint   Patient presents with    Shortness of Breath       HISTORY OF PRESENT ILLNESS   (Location, Timing/Onset, Context/Setting, Quality, Duration, Modifying Factors, Severity, Associated Signs and Symptoms)  Note limiting factors. Chief Complaint: Wheezy cough    Gaudencio Diaz is a 24 y.o. female who presents with complaint of tightness in her chest and a wheezy cough. Known asthma and she does smoke. Out of her inhaler. Onset 2 days 3 days ago with progression. She has not had Covid vaccine. She did ask where could she got 1 and I responded any local pharmacy. She has been tested previously for Covid. She does work in healthcare. She describes no gastrointestinal or urinary complaints. Denies any fevers or chills. No change in taste or smell. No headache or myalgia. Nursing Notes were all reviewed and agreed with or any disagreements were addressed in the HPI. REVIEW OF SYSTEMS    (2-9 systems for level 4, 10 or more for level 5)     Review of Systems    Positives and Pertinent negatives as per HPI. Except as noted above in the ROS, all other systems were reviewed and negative. PAST MEDICAL HISTORY     Past Medical History:   Diagnosis Date    ADHD (attention deficit hyperactivity disorder)     ADHD (attention deficit hyperactivity disorder)     Reflux          SURGICAL HISTORY   History reviewed. No pertinent surgical history.       [address]       Discharge Medication List as of 9/11/2021  2:11 PM      CONTINUE these medications which have NOT CHANGED    Details   lidocaine (LIDODERM) 5 % Place 1 patch onto the skin daily 12 hours on, 12 hours off., Disp-30 patch,R-0Print      ondansetron (ZOFRAN) 4 MG tablet Take 1 tablet by mouth every 8 hours as needed for Nausea, Disp-10 tablet, R-0Print      FLUoxetine (PROZAC) 20 MG capsule Take 40 mg by mouth daily      Loratadine 10 MG CAPS Take  by mouth. cetirizine (ZYRTEC ALLERGY) 10 MG tablet Take 10 mg by mouth daily. amphetamine-dextroamphetamine (ADDERALL, 10MG,) 10 MG tablet Take 10 mg by mouth daily. amphetamine-dextroamphetamine (ADDERALL) 30 MG tablet Take 30 mg by mouth daily. Melatonin 3-10 MG TABS Take  by mouth. ALLERGIES     Midol [acetaminophen], Pollen extract, and Cefazolin    FAMILYHISTORY     History reviewed. No pertinent family history. SOCIAL HISTORY       Social History     Tobacco Use    Smoking status: Current Every Day Smoker     Packs/day: 0.50    Smokeless tobacco: Current User     Types: Chew   Substance Use Topics    Alcohol use: Yes     Comment: occasional    Drug use: Yes     Types: Marijuana       SCREENINGS    [place] Coma Scale  Eye Opening: Spontaneous  Best Verbal Response: Oriented  Best Motor Response: Obeys commands  [place] Coma Scale Score: 15        PHYSICAL EXAM    (up to 7 for level 4, 8 or more for level 5)     ED Triage Vitals   BP Temp Temp src Pulse Resp SpO2 Height Weight   -- -- -- -- -- -- -- --       Physical Exam  Vitals and nursing note reviewed. Constitutional:       Appearance: Normal appearance. She is well-developed. She is obese. HENT:      Head: Normocephalic and atraumatic. Right Ear: External ear normal.      Left Ear: External ear normal.      Mouth/Throat:      Mouth: Mucous membranes are moist.   Eyes:      General: No scleral icterus. Right eye: No discharge. Left eye: No discharge.       Conjunctiva/sclera: Conjunctivae normal.   Cardiovascular: Rate and Rhythm: Normal rate and regular rhythm. Heart sounds: Normal heart sounds. Pulmonary:      Effort: Pulmonary effort is normal.      Breath sounds: Wheezing present. Musculoskeletal:         General: Normal range of motion. Cervical back: Normal range of motion and neck supple. Right lower leg: No edema. Left lower leg: No edema. Skin:     General: Skin is warm and dry. Neurological:      General: No focal deficit present. Mental Status: She is alert and oriented to person, place, and time. Mental status is at baseline. Psychiatric:         Mood and Affect: Mood normal.         Behavior: Behavior normal.         Thought Content: Thought content normal.         Judgment: Judgment normal.         DIAGNOSTIC RESULTS   LABS:    Labs Reviewed   RESPIRATORY PANEL, MOLECULAR, WITH COVID-19 - Abnormal; Notable for the following components:       Result Value    Adenovirus Detection by PCR DETECTED BY PCR (*)     All other components within normal limits       When ordered only abnormal lab results are displayed. All other labs were within normal range or not returned as of this dictation. EKG: When ordered, EKG's are interpreted by the Emergency Department Physician in the absence of a cardiologist.  Please see their note for interpretation of EKG. RADIOLOGY:   Non-plain film images such as CT, Ultrasound and MRI are read by the radiologist. Plain radiographic images are visualized and preliminarily interpreted by the ED Provider with the below findings:        Interpretation per the Radiologist below, if available at the time of this note:    XR CHEST PORTABLE   Final Result   Limited exam with no definite pneumonia or pulmonary edema. No results found.         PROCEDURES   Unless otherwise noted below, none     Procedures    CRITICAL CARE TIME   N/A    CONSULTS:  None      EMERGENCY DEPARTMENT COURSE and DIFFERENTIAL DIAGNOSIS/MDM:   Vitals:    Vitals: promethazine-dextromethorphan (PROMETHAZINE-DM) 6.25-15 MG/5ML syrup Take 5 mLs by mouth every 6 hours as needed for Cough, Disp-120 mL, R-0Normal      guaiFENesin (MUCINEX) 600 MG extended release tablet Take 1 tablet by mouth 2 times daily for 15 days, Disp-30 tablet, R-0Normal             DISCONTINUED MEDICATIONS:  Discharge Medication List as of 9/11/2021  2:11 PM      STOP taking these medications       ketorolac (TORADOL) 10 MG tablet Comments:   Reason for Stopping:         naproxen (NAPROSYN) 500 MG tablet Comments:   Reason for Stopping:         sulfamethoxazole-trimethoprim (BACTRIM DS) 800-160 MG per tablet Comments:   Reason for Stopping:                      (Please note that portions of this note were completed with a voice recognition program.  Efforts were made to edit the dictations but occasionally words are mis-transcribed. )    Alona Ashford PA-C (electronically signed)           Alona Ashford PA-C  09/13/21 6810

## 2021-09-11 NOTE — ED PROVIDER NOTES
12 lead EKG per my interpretation:  Sinus Tachycardia 105  Axis is   Normal  QTc is  452  There is no specific T wave changes appreciated. There is no specific ST wave changes appreciated.     Prior EKG to compare with was not available         Ryan Estrada DO  09/11/21 1058

## 2021-09-11 NOTE — ED NOTES
Discharge instructions reviewed with pt and questions addressed at this time. Pt alert and oriented x 4 at time of discharge, no signs of acute distress noted. Pt ambulatory to Emergency Department waiting room and steady gait noted.         Rose Marie Sanchez RN  09/11/21 0203

## 2022-04-26 ENCOUNTER — HOSPITAL ENCOUNTER (EMERGENCY)
Age: 22
Discharge: HOME | End: 2022-04-26
Payer: COMMERCIAL

## 2022-04-26 VITALS
BODY MASS INDEX: 37.56 KG/M2 | OXYGEN SATURATION: 100 % | DIASTOLIC BLOOD PRESSURE: 82 MMHG | HEIGHT: 64 IN | RESPIRATION RATE: 17 BRPM | HEART RATE: 99 BPM | TEMPERATURE: 98.9 F | WEIGHT: 220 LBS | SYSTOLIC BLOOD PRESSURE: 138 MMHG

## 2022-04-26 DIAGNOSIS — R10.84 GENERALIZED ABDOMINAL PAIN: Primary | ICD-10-CM

## 2022-04-26 DIAGNOSIS — R11.2 NON-INTRACTABLE VOMITING WITH NAUSEA, UNSPECIFIED VOMITING TYPE: ICD-10-CM

## 2022-04-26 DIAGNOSIS — R19.7 DIARRHEA, UNSPECIFIED TYPE: ICD-10-CM

## 2022-04-26 LAB
ALBUMIN SERPL-MCNC: 3.9 GM/DL (ref 3.4–5)
ALP BLD-CCNC: 60 IU/L (ref 40–129)
ALT SERPL-CCNC: 27 U/L (ref 10–40)
ANION GAP SERPL CALCULATED.3IONS-SCNC: 11 MMOL/L (ref 4–16)
AST SERPL-CCNC: 17 IU/L (ref 15–37)
BACTERIA: NEGATIVE /HPF
BASOPHILS ABSOLUTE: 0 K/CU MM
BASOPHILS RELATIVE PERCENT: 0.3 % (ref 0–1)
BILIRUB SERPL-MCNC: 0.8 MG/DL (ref 0–1)
BILIRUBIN URINE: NEGATIVE MG/DL
BLOOD, URINE: NEGATIVE
BUN BLDV-MCNC: 8 MG/DL (ref 6–23)
CALCIUM SERPL-MCNC: 8.9 MG/DL (ref 8.3–10.6)
CHLORIDE BLD-SCNC: 108 MMOL/L (ref 99–110)
CLARITY: ABNORMAL
CO2: 20 MMOL/L (ref 21–32)
COLOR: YELLOW
CREAT SERPL-MCNC: 0.6 MG/DL (ref 0.6–1.1)
DIFFERENTIAL TYPE: ABNORMAL
EOSINOPHILS ABSOLUTE: 0.1 K/CU MM
EOSINOPHILS RELATIVE PERCENT: 1.8 % (ref 0–3)
GFR AFRICAN AMERICAN: >60 ML/MIN/1.73M2
GFR NON-AFRICAN AMERICAN: >60 ML/MIN/1.73M2
GLUCOSE BLD-MCNC: 97 MG/DL (ref 70–99)
GLUCOSE, URINE: NEGATIVE MG/DL
HCG QUALITATIVE: NEGATIVE
HCT VFR BLD CALC: 45.7 % (ref 37–47)
HEMOGLOBIN: 14.1 GM/DL (ref 12.5–16)
IMMATURE NEUTROPHIL %: 0.3 % (ref 0–0.43)
KETONES, URINE: NEGATIVE MG/DL
LEUKOCYTE ESTERASE, URINE: NEGATIVE
LIPASE: 22 IU/L (ref 13–60)
LYMPHOCYTES ABSOLUTE: 1.5 K/CU MM
LYMPHOCYTES RELATIVE PERCENT: 19.2 % (ref 24–44)
MCH RBC QN AUTO: 28.6 PG (ref 27–31)
MCHC RBC AUTO-ENTMCNC: 30.9 % (ref 32–36)
MCV RBC AUTO: 92.7 FL (ref 78–100)
MONOCYTES ABSOLUTE: 0.5 K/CU MM
MONOCYTES RELATIVE PERCENT: 6.1 % (ref 0–4)
MUCUS: ABNORMAL HPF
NITRITE URINE, QUANTITATIVE: NEGATIVE
NUCLEATED RBC %: 0 %
PDW BLD-RTO: 13.1 % (ref 11.7–14.9)
PH, URINE: 6 (ref 5–8)
PLATELET # BLD: 198 K/CU MM (ref 140–440)
PMV BLD AUTO: 11.9 FL (ref 7.5–11.1)
POTASSIUM SERPL-SCNC: 4.2 MMOL/L (ref 3.5–5.1)
PROTEIN UA: NEGATIVE MG/DL
RBC # BLD: 4.93 M/CU MM (ref 4.2–5.4)
RBC URINE: 2 /HPF (ref 0–6)
SEGMENTED NEUTROPHILS ABSOLUTE COUNT: 5.7 K/CU MM
SEGMENTED NEUTROPHILS RELATIVE PERCENT: 72.3 % (ref 36–66)
SODIUM BLD-SCNC: 139 MMOL/L (ref 135–145)
SPECIFIC GRAVITY UA: 1.02 (ref 1–1.03)
SQUAMOUS EPITHELIAL: 20 /HPF
TOTAL IMMATURE NEUTOROPHIL: 0.02 K/CU MM
TOTAL NUCLEATED RBC: 0 K/CU MM
TOTAL PROTEIN: 6.9 GM/DL (ref 6.4–8.2)
TRICHOMONAS: ABNORMAL /HPF
UROBILINOGEN, URINE: 0.2 MG/DL (ref 0.2–1)
WBC # BLD: 7.9 K/CU MM (ref 4–10.5)
WBC UA: 11 /HPF (ref 0–5)

## 2022-04-26 PROCEDURE — 85025 COMPLETE CBC W/AUTO DIFF WBC: CPT

## 2022-04-26 PROCEDURE — 80053 COMPREHEN METABOLIC PANEL: CPT

## 2022-04-26 PROCEDURE — 83690 ASSAY OF LIPASE: CPT

## 2022-04-26 PROCEDURE — 99283 EMERGENCY DEPT VISIT LOW MDM: CPT

## 2022-04-26 PROCEDURE — 81001 URINALYSIS AUTO W/SCOPE: CPT

## 2022-04-26 PROCEDURE — 84703 CHORIONIC GONADOTROPIN ASSAY: CPT

## 2022-04-26 RX ORDER — DICYCLOMINE HYDROCHLORIDE 10 MG/1
10 CAPSULE ORAL
Qty: 20 CAPSULE | Refills: 0 | Status: SHIPPED | OUTPATIENT
Start: 2022-04-26 | End: 2022-10-25

## 2022-04-26 RX ORDER — ONDANSETRON 4 MG/1
4 TABLET, ORALLY DISINTEGRATING ORAL EVERY 8 HOURS PRN
Qty: 15 TABLET | Refills: 0 | Status: SHIPPED | OUTPATIENT
Start: 2022-04-26 | End: 2022-10-25

## 2022-04-26 RX ORDER — FAMOTIDINE 20 MG/1
20 TABLET, FILM COATED ORAL 2 TIMES DAILY
Qty: 30 TABLET | Refills: 0 | Status: SHIPPED | OUTPATIENT
Start: 2022-04-26 | End: 2022-10-25

## 2022-04-26 NOTE — ED PROVIDER NOTES
eMERGENCY dEPARTMENT eNCOUnter         [address] [facility] EMERGENCY DEPARTMENT     PCP: Nicolette Moore MD    CHIEF COMPLAINT    Chief Complaint   Patient presents with    Abdominal Pain       HPI    Wilma Steele is a 22 y.o. female who presents with generalized abdominal pain nausea vomiting diarrhea. Onset prior to arrival, yesterday. Context is patient denies new foods medications recent travel or antibiotic use. States she is having generalized upper abdominal cramping pain with episodes of nonbilious/nonbloody vomit. However was noted by triage staff to drink 2 cans of Dr. Jose Arnold while in the waiting room without difficulty. States she is also had loose nonbloody stools. No hematemesis, coffee-ground emesis. No abdominal surgical history or previous GI history. Patient has tried Pepto-Bismol x1 dose at home without relief of symptoms. No chest pain shortness of breath, cough. No dysuria hematuria. Denies any concern for pregnancy. No abnormal vaginal discharge, odor or concern for STI. Denying any fever or chills. REVIEW OF SYSTEMS    Constitutional:  Denies fever, chills, weight loss or weakness   HENT:  Denies sore throat or ear pain   Cardiovascular:  Denies chest pain, palpitations or swelling   Respiratory:  Denies cough or shortness of breath   GI:  See HPI above  : No hematuria or dysuria. No vaginal symptoms. Musculoskeletal:  Denies back pain or groin pain or masses. No pain or swelling of extremities.   Skin:  Denies rash  Neurologic:  Denies headache, focal weakness or sensory changes   Endocrine:  Denies polyuria or polydypsia   Lymphatic:  Denies swollen glands     All other review of systems are negative  See HPI and nursing notes for additional information     PAST MEDICAL & SURGICAL HISTORY    Past Medical History:   Diagnosis Date    ADHD (attention deficit hyperactivity disorder)     ADHD (attention deficit hyperactivity disorder)  Reflux      History reviewed. No pertinent surgical history. CURRENT MEDICATIONS    Current Outpatient Rx   Medication Sig Dispense Refill    famotidine (PEPCID) 20 MG tablet Take 1 tablet by mouth 2 times daily 30 tablet 0    ondansetron (ZOFRAN ODT) 4 MG disintegrating tablet Take 1 tablet by mouth every 8 hours as needed for Nausea 15 tablet 0    dicyclomine (BENTYL) 10 MG capsule Take 1 capsule by mouth 4 times daily (before meals and nightly) 20 capsule 0    albuterol sulfate  (90 Base) MCG/ACT inhaler Inhale 1-2 puffs into the lungs every 6 hours as needed for Wheezing 18 g 0    lidocaine (LIDODERM) 5 % Place 1 patch onto the skin daily 12 hours on, 12 hours off. 30 patch 0    ondansetron (ZOFRAN) 4 MG tablet Take 1 tablet by mouth every 8 hours as needed for Nausea 10 tablet 0    FLUoxetine (PROZAC) 20 MG capsule Take 40 mg by mouth daily      Loratadine 10 MG CAPS Take  by mouth.  cetirizine (ZYRTEC ALLERGY) 10 MG tablet Take 10 mg by mouth daily.  amphetamine-dextroamphetamine (ADDERALL, 10MG,) 10 MG tablet Take 10 mg by mouth daily.  amphetamine-dextroamphetamine (ADDERALL) 30 MG tablet Take 30 mg by mouth daily.  Melatonin 3-10 MG TABS Take  by mouth.            ALLERGIES    Allergies   Allergen Reactions    Midol [Acetaminophen] Shortness Of Breath and Rash    Pollen Extract Anaphylaxis     sneezing    Cefazolin        SOCIAL AND FAMILY HISTORY    Social History     Socioeconomic History    Marital status: Single     Spouse name: None    Number of children: None    Years of education: None    Highest education level: None   Occupational History    None   Tobacco Use    Smoking status: Current Every Day Smoker     Packs/day: 0.50    Smokeless tobacco: Current User     Types: Chew   Substance and Sexual Activity    Alcohol use: Yes     Comment: occasional    Drug use: Yes     Types: Marijuana Garon Kettle)    Sexual activity: None   Other Topics Concern  None   Social History Narrative    None     Social Determinants of Health     Financial Resource Strain:     Difficulty of Paying Living Expenses: Not on file   Food Insecurity:     Worried About Running Out of Food in the Last Year: Not on file    Alyce of Food in the Last Year: Not on file   Transportation Needs:     Lack of Transportation (Medical): Not on file    Lack of Transportation (Non-Medical): Not on file   Physical Activity:     Days of Exercise per Week: Not on file    Minutes of Exercise per Session: Not on file   Stress:     Feeling of Stress : Not on file   Social Connections:     Frequency of Communication with Friends and Family: Not on file    Frequency of Social Gatherings with Friends and Family: Not on file    Attends [employer] Services: Not on file    Active Member of [address] [place] [address] [employer] or Organizations: Not on file    Attends Club or Organization Meetings: Not on file    Marital Status: Not on file   Intimate Partner Violence:     Fear of Current or Ex-Partner: Not on file    Emotionally Abused: Not on file    Physically Abused: Not on file    Sexually Abused: Not on file   Housing Stability:     Unable to Pay for Housing in the Last Year: Not on file    Number of [place] in the Last Year: Not on file    Unstable Housing in the Last Year: Not on file     History reviewed. No pertinent family history. PHYSICAL EXAM    VITAL SIGNS: /82   Pulse 99   Temp 98.9 °F (37.2 °C)   Resp 17   Ht 5' 4\" (1.626 m)   Wt 220 lb (99.8 kg)   SpO2 100%   BMI 37.76 kg/m²   General:  Well developed, elevated BMI, In no acute distress.   Sitting with two unopened cans of Dr. Joanna Walker in triage room  Eyes:  Sclera nonicteric, Conjunctiva moist, No discharge  Head:  Normocephalic, Atramautic  Neck/Lymphatics: Supple, no JVD, no swollen nodes  Respiratory:  Clear to ausculation bilaterally, No retractions, Non labored breathing  Cardiovascular:  RRR, No murmurs/gallops/thrills  GI: No gross discoloration. Bowel sounds present in all quadrants, No audible bruits. Soft,  Nondistended. Obese abdomen, no localized abdominal tenderness rebound or guarding. No palpable pulsatile masses or obvious hernia on Valsalva maneuver. However limited abdominal exam secondary to patient's body habitus. No McBurney's point tenderness  Negative Rovsing sign   Negative Crespo's sign. Back:   No CVA tenderness to percussion.   Musculoskeletal:  No edema, No deformity  Peripheral Vascular: Distal pulses 2+ equal bilaterally  Integument: No rash, Normal turgor  Neurologic:  Alert & oriented, Normal speech  Psychiatric: Cooperative, pleasant affect       I have reviewed and interpreted all of the currently available lab results from this visit (if applicable):  Results for orders placed or performed during the hospital encounter of 04/26/22   CBC with Auto Differential   Result Value Ref Range    WBC 7.9 4.0 - 10.5 K/CU MM    RBC 4.93 4.2 - 5.4 M/CU MM    Hemoglobin 14.1 12.5 - 16.0 GM/DL    Hematocrit 45.7 37 - 47 %    MCV 92.7 78 - 100 FL    MCH 28.6 27 - 31 PG    MCHC 30.9 (L) 32.0 - 36.0 %    RDW 13.1 11.7 - 14.9 %    Platelets 943 908 - 677 K/CU MM    MPV 11.9 (H) 7.5 - 11.1 FL    Differential Type AUTOMATED DIFFERENTIAL     Segs Relative 72.3 (H) 36 - 66 %    Lymphocytes % 19.2 (L) 24 - 44 %    Monocytes % 6.1 (H) 0 - 4 %    Eosinophils % 1.8 0 - 3 %    Basophils % 0.3 0 - 1 %    Segs Absolute 5.7 K/CU MM    Lymphocytes Absolute 1.5 K/CU MM    Monocytes Absolute 0.5 K/CU MM    Eosinophils Absolute 0.1 K/CU MM    Basophils Absolute 0.0 K/CU MM    Nucleated RBC % 0.0 %    Total Nucleated RBC 0.0 K/CU MM    Total Immature Neutrophil 0.02 K/CU MM    Immature Neutrophil % 0.3 0 - 0.43 %   Comprehensive Metabolic Panel w/ Reflex to MG   Result Value Ref Range    Sodium 139 135 - 145 MMOL/L    Potassium 4.2 3.5 - 5.1 MMOL/L    Chloride 108 99 - 110 mMol/L    CO2 20 (L) 21 - 32 MMOL/L    BUN 8 6 - 23 MG/DL CREATININE 0.6 0.6 - 1.1 MG/DL    Glucose 97 70 - 99 MG/DL    Calcium 8.9 8.3 - 10.6 MG/DL    Albumin 3.9 3.4 - 5.0 GM/DL    Total Protein 6.9 6.4 - 8.2 GM/DL    Total Bilirubin 0.8 0.0 - 1.0 MG/DL    ALT 27 10 - 40 U/L    AST 17 15 - 37 IU/L    Alkaline Phosphatase 60 40 - 129 IU/L    GFR Non-African American >60 >60 mL/min/1.73m2    GFR African American >60 >60 mL/min/1.73m2    Anion Gap 11 4 - 16   Lipase   Result Value Ref Range    Lipase 22 13 - 60 IU/L   Urinalysis with Microscopic   Result Value Ref Range    Color, UA YELLOW YELLOW    Clarity, UA CLOUDY (A) CLEAR    Glucose, Urine NEGATIVE NEGATIVE MG/DL    Bilirubin Urine NEGATIVE NEGATIVE MG/DL    Ketones, Urine NEGATIVE NEGATIVE MG/DL    Specific Gravity, UA 1.025 1.001 - 1.035    Blood, Urine NEGATIVE NEGATIVE    pH, Urine 6.0 5.0 - 8.0    Protein, UA NEGATIVE NEGATIVE MG/DL    Urobilinogen, Urine 0.2 0.2 - 1.0 MG/DL    Nitrite Urine, Quantitative NEGATIVE NEGATIVE    Leukocyte Esterase, Urine NEGATIVE NEGATIVE    RBC, UA 2 0 - 6 /HPF    WBC, UA 11 (H) 0 - 5 /HPF    Bacteria, UA NEGATIVE NEGATIVE /HPF    Squam Epithel, UA 20 /HPF    Mucus, UA MANY (A) NEGATIVE HPF    Trichomonas, UA NONE SEEN NONE SEEN /HPF   HCG Qualitative, Serum   Result Value Ref Range    hCG Qual NEGATIVE         RADIOLOGY/PROCEDURES    NONE      ED COURSE & MEDICAL DECISION MAKING      Vital signs and nursing notes reviewed during ED course. I have independently evaluated this patient . Supervising MD - Dr Janelle Moore - present in the Emergency Department, available for consultation, throughout entirety of  patient care. All pertinent Lab data and radiographic results reviewed with patient at bedside. The patient and / or the family were informed of the results of any tests, a time was given to answer questions, a plan was proposed and they agreed with plan.          Differential diagnosis: Abdominal Aortic Aneurysm, Ischemic Bowel, Bowel Obstruction, Acute Cholecystitis, Acute Appendicitis, other    Clinical  IMPRESSION    1. Generalized abdominal pain    2. Non-intractable vomiting with nausea, unspecified vomiting type    3. Diarrhea, unspecified type          Patient presents with generalized abdominal pain nausea vomiting diarrhea. Work-up was initiated triage and I did evaluate patient in triage resolved after a prolonged ED wait time. On my exam, patient is resting comfortably nontoxic. She is afebrile nontoxic with stable vitals she does have 2 cans of unopened Dr Lion Ramirez soda and per triage nursing staff, she had been drinking Dr. Lion Macdonalda without difficulty or further vomiting. Unremarkable cardiopulmonary exam.  Abdomen is obese, soft nondistended without localized tenderness rebound or guarding. Limited abdominal exam secondary to patient's body habitus but no CVA tenderness or localized tenderness in the right lower quadrant [place]'s point. CBC with normal white count noted elevated segs. Normal hemoglobin. CMP with a mildly decreased CO2 of 20 which may be related to vomiting however normal anion gap patient does not appear clinically septic. No electrolyte disturbance. Normal lipase. Serum pregnancy is negative. UA shows 11 white blood cells but negative for bacteria leukocytes and nitrites. Sample is contaminated with 20 squamous epithelial cells and patient is denying any urinary complaints at this time. Given length of symptoms, discussed with patient likely viral gastritis versus gastritis versus GERD versus PUD. We discussed supportive symptomatic care, diet modifications and close follow-up with PCP back in the ED for serial abdominal exams as cannot completely rule out other etiology early as process.   Risk versus benefits of additional imaging at this time including ultrasound versus CT was discussed however patient states that she has been waiting a long time in the ER and does not wish to wait any longer for her to be discharged home with trial of symptomatic care which I do think is reasonable. I estimate there is low risk for acute appendicitis, bowel obstruction, cholecystitis, ruptured diverticulitis, incarcerated hernia, hemorrhagic pancreatitis, or perforated bowel/ulcer, ectopic pregnancy, ovarian torsion or tubo-ovarian ovarian abscess thus I consider the discharge disposition reasonable. Patient is discharged stable condition with Zofran, Pepcid and Bentyl. Encouraged rest, clear liquids and avoiding any soda or carbonated beverages including Dr. Evy Justice. Encouraged a bland/brat diet with gradual advancement to normal diet as tolerated. Discussed the unclear etiology of patient's symptoms today and the need for return to emergency department in 8-12 hours for repeat evaluation, sooner if symptoms worsen or any new symptoms develop. Patient agrees to return emergency department if symptoms worsen or any new symptoms develop. Comment: Please note this report has been produced using speech recognition software and may contain errors related to that system including errors in grammar, punctuation, and spelling, as well as words and phrases that may be inappropriate. If there are any questions or concerns please feel free to contact the dictating provider for clarification.           Dian Wick PA-C  04/26/22 2354

## 2022-09-11 ENCOUNTER — HOSPITAL ENCOUNTER (EMERGENCY)
Age: 22
Discharge: HOME | End: 2022-09-11
Payer: COMMERCIAL

## 2022-09-11 ENCOUNTER — APPOINTMENT (OUTPATIENT)
Dept: GENERAL RADIOLOGY | Age: 22
End: 2022-09-11
Payer: COMMERCIAL

## 2022-09-11 VITALS
DIASTOLIC BLOOD PRESSURE: 89 MMHG | BODY MASS INDEX: 50.02 KG/M2 | RESPIRATION RATE: 24 BRPM | WEIGHT: 293 LBS | SYSTOLIC BLOOD PRESSURE: 143 MMHG | OXYGEN SATURATION: 97 % | HEIGHT: 64 IN | HEART RATE: 105 BPM

## 2022-09-11 DIAGNOSIS — J18.9 PNEUMONIA DUE TO INFECTIOUS ORGANISM, UNSPECIFIED LATERALITY, UNSPECIFIED PART OF LUNG: Primary | ICD-10-CM

## 2022-09-11 LAB
SARS-COV-2, NAAT: NOT DETECTED
SOURCE: NORMAL

## 2022-09-11 PROCEDURE — 71045 X-RAY EXAM CHEST 1 VIEW: CPT

## 2022-09-11 PROCEDURE — 87081 CULTURE SCREEN ONLY: CPT

## 2022-09-11 PROCEDURE — 87430 STREP A AG IA: CPT

## 2022-09-11 PROCEDURE — 99285 EMERGENCY DEPT VISIT HI MDM: CPT

## 2022-09-11 PROCEDURE — 93005 ELECTROCARDIOGRAM TRACING: CPT | Performed by: PHYSICIAN ASSISTANT

## 2022-09-11 PROCEDURE — 87635 SARS-COV-2 COVID-19 AMP PRB: CPT

## 2022-09-11 RX ORDER — AZITHROMYCIN 250 MG/1
250 TABLET, FILM COATED ORAL DAILY
Qty: 4 TABLET | Refills: 0 | Status: SHIPPED | OUTPATIENT
Start: 2022-09-11 | End: 2022-09-15

## 2022-09-11 RX ORDER — AZITHROMYCIN 250 MG/1
500 TABLET, FILM COATED ORAL ONCE
Status: DISCONTINUED | OUTPATIENT
Start: 2022-09-11 | End: 2022-09-12 | Stop reason: HOSPADM

## 2022-09-11 RX ORDER — PREDNISONE 20 MG/1
60 TABLET ORAL ONCE
Status: DISCONTINUED | OUTPATIENT
Start: 2022-09-11 | End: 2022-09-12 | Stop reason: HOSPADM

## 2022-09-11 RX ORDER — ALBUTEROL SULFATE 90 UG/1
2 AEROSOL, METERED RESPIRATORY (INHALATION) EVERY 4 HOURS PRN
Qty: 18 G | Refills: 1 | Status: SHIPPED | OUTPATIENT
Start: 2022-09-11 | End: 2022-10-25 | Stop reason: SDUPTHER

## 2022-09-11 RX ORDER — PREDNISONE 50 MG/1
50 TABLET ORAL DAILY
Qty: 3 TABLET | Refills: 0 | Status: SHIPPED | OUTPATIENT
Start: 2022-09-11 | End: 2022-09-14

## 2022-09-11 ASSESSMENT — PAIN DESCRIPTION - ONSET: ONSET: ON-GOING

## 2022-09-11 ASSESSMENT — PAIN DESCRIPTION - DESCRIPTORS: DESCRIPTORS: BURNING

## 2022-09-11 ASSESSMENT — PAIN SCALES - GENERAL: PAINLEVEL_OUTOF10: 9

## 2022-09-11 ASSESSMENT — PAIN - FUNCTIONAL ASSESSMENT: PAIN_FUNCTIONAL_ASSESSMENT: 0-10

## 2022-09-11 ASSESSMENT — PAIN DESCRIPTION - LOCATION: LOCATION: THROAT

## 2022-09-11 ASSESSMENT — PAIN DESCRIPTION - PAIN TYPE: TYPE: ACUTE PAIN

## 2022-09-11 ASSESSMENT — PAIN DESCRIPTION - FREQUENCY: FREQUENCY: CONTINUOUS

## 2022-09-11 NOTE — LETTER
[facility] Emergency Department  [address] [address]  Phone: [phone]  Fax: [phone]               September 11, 2022    Patient: Zander Willard   YOB: 2000   Date of Visit: 9/11/2022       To Whom It May Concern:    Leticia Carlos was seen and treated in our emergency department on 9/11/2022. Shemay return to work on 9/13/22.       Sincerely,       Jered Morales RN        Signature:__________________________________

## 2022-09-12 LAB
EKG ATRIAL RATE: 98 BPM
EKG DIAGNOSIS: NORMAL
EKG P AXIS: 42 DEGREES
EKG P-R INTERVAL: 142 MS
EKG Q-T INTERVAL: 334 MS
EKG QRS DURATION: 72 MS
EKG QTC CALCULATION (BAZETT): 426 MS
EKG R AXIS: 25 DEGREES
EKG T AXIS: 9 DEGREES
EKG VENTRICULAR RATE: 98 BPM

## 2022-09-12 PROCEDURE — 93010 ELECTROCARDIOGRAM REPORT: CPT | Performed by: INTERNAL MEDICINE

## 2022-09-12 NOTE — ED PROVIDER NOTES
12 lead EKG per my interpretation:  Normal Sinus Rhythm 98  Axis is   Normal  QTc is  426  There is no specific T wave changes appreciated. Inverted T wave III  There is no specific ST wave changes appreciated.     Prior EKG to compare with was not available         aMry Britton DO  09/11/22 1482

## 2022-09-12 NOTE — ED NOTES
Discharge instructions reviewed with patient; pt voiced understanding at this time.       Nader Luke RN  09/11/22 5503

## 2022-09-12 NOTE — ACP (ADVANCE CARE PLANNING)
Patient does not have any ACP documents/Medical Power of . LSW notes hospital will follow [place]'s Next of Kin hierarchy in the following descending order for priority:     Guardian  Spouse  [de-identified] of adult Children  Parents  [de-identified] of adult Siblings  Nearest Relative not described above     Per [place] Next of Kin hierarchy cannot be girlfriend.

## 2022-09-12 NOTE — ED PROVIDER NOTES
Oral Once Filemon Lair, PA-C        predniSONE (DELTASONE) tablet 60 mg  60 mg Oral Once Filemon Lair, PA-C         Current Outpatient Medications   Medication Sig Dispense Refill    albuterol sulfate HFA (PROVENTIL HFA) 108 (90 Base) MCG/ACT inhaler Inhale 2 puffs into the lungs every 4 hours as needed for Wheezing or Shortness of Breath With spacer (and mask if indicated). Thanks. 18 g 1    azithromycin (ZITHROMAX) 250 MG tablet Take 1 tablet by mouth daily for 4 days 4 tablet 0    predniSONE (DELTASONE) 50 MG tablet Take 1 tablet by mouth daily for 3 days 3 tablet 0    famotidine (PEPCID) 20 MG tablet Take 1 tablet by mouth 2 times daily 30 tablet 0    ondansetron (ZOFRAN ODT) 4 MG disintegrating tablet Take 1 tablet by mouth every 8 hours as needed for Nausea 15 tablet 0    dicyclomine (BENTYL) 10 MG capsule Take 1 capsule by mouth 4 times daily (before meals and nightly) 20 capsule 0    albuterol sulfate  (90 Base) MCG/ACT inhaler Inhale 1-2 puffs into the lungs every 6 hours as needed for Wheezing 18 g 0    lidocaine (LIDODERM) 5 % Place 1 patch onto the skin daily 12 hours on, 12 hours off. 30 patch 0    ondansetron (ZOFRAN) 4 MG tablet Take 1 tablet by mouth every 8 hours as needed for Nausea 10 tablet 0    FLUoxetine (PROZAC) 20 MG capsule Take 40 mg by mouth daily      Loratadine 10 MG CAPS Take  by mouth. cetirizine (ZYRTEC ALLERGY) 10 MG tablet Take 10 mg by mouth daily. amphetamine-dextroamphetamine (ADDERALL, 10MG,) 10 MG tablet Take 10 mg by mouth daily. amphetamine-dextroamphetamine (ADDERALL) 30 MG tablet Take 30 mg by mouth daily. Melatonin 3-10 MG TABS Take  by mouth.          Allergies   Allergen Reactions    Midol [Acetaminophen] Shortness Of Breath and Rash    Pollen Extract Anaphylaxis     sneezing    Cefazolin        Nursing Notes Reviewed    Physical Exam:  ED Triage Vitals [09/11/22 5076]   Enc Vitals Group      BP (!) 143/89      Heart Rate (!) Result   Increased lung markings bilaterally, may be related to bronchitis versus mild   pneumonia. EKG (if obtained):   Please See Note of attending physician for EKG interpretation. Chart review shows recent radiograph(s):  XR CHEST PORTABLE    Result Date: 9/11/2022  EXAMINATION: ONE XRAY VIEW OF THE CHEST 9/11/2022 9:54 pm COMPARISON: September 11, 2021 at 10:35 HISTORY: ORDERING SYSTEM PROVIDED HISTORY: cough TECHNOLOGIST PROVIDED HISTORY: Reason for exam:->cough Reason for Exam: cough sob Additional signs and symptoms: chest pain , pharyngitis FINDINGS: The cardiomediastinal silhouette is stable. There are increased lung markings bilaterally, likely related to bronchitis versus mild pneumonia. There is no pleural effusion. There is no pneumothorax. There is no acute osseous abnormality. Increased lung markings bilaterally, may be related to bronchitis versus mild pneumonia. MDM:   History Signs and symptoms congruent with pneumonia      Xray shows pnemonia. According to Bernard Clarisse criteria patient is at a very low risk for pulmonary embolism. Patient has no clinical signs of DVT (3.0) PE is not #1 differential diagnosis (3.0), heart rate is  tachycardic (1.5) patient has no recent immobilization or surgery in the previous 4 weeks (1.5), patient has no history of PE or DVT (1.5), patient has no hemoptysis (1), patient has no malignancy with treatment within 6 months (1). With a Wells criteria score of 1.5 I consider it very reasonable to forego any further evaluation or emergent basis investigating pulmonary embolism. I do believe that further lab work, testing, imaging such as CT scan, risk of radiation, dye outweigh the benefit with a extremely low statistical likelihood as well as very low clinical suspicion.      I estimate there is LOW risk for (including but not limited to) ACUTE CORONARY SYNDROME, PNEUMONIA REQUIRING ADMISSION, SEPSIS OR BACTERIAL MENINGITIS thus I consider the discharge disposition reasonable. Soledad Ferrell (or their surrogate) and I have discussed the diagnosis and risks, and we agree with discharging home with close follow-up. We also discussed returning to the Emergency Department immediately if new or worsening symptoms occur. We have discussed the symptoms which are most concerning that necessitate immediate return. bolism, pneumothorax, other. Pt is to be discharged home. Pt is told to return immediately to the emergency department if he has any new, worrisome or worsening symptoms. Pt is to follow up with PCP within 2 days. Patient vocalizes agreement and understanding with this plan and he has no questions upon disposition. Pt is comfortable upon disposition home. Patient is stable, Patients vital signs are stable. Vital signs and nursing notes reviewed during ED course. I independently saw patient today in the ED. All pertinent Lab data and radiographic results reviewed with patient at bedside. The patient and/or the family were informed of the results of any tests/labs/imaging, the treatment plan, and time was allotted to answer questions. See chart for details of medications given during the ED stay. Clinical Impression:  1. Pneumonia due to infectious organism, unspecified laterality, unspecified part of lung      (Please note that portions of this note may have been completed with a voice recognition program. Efforts were made to edit the dictations but occasionally words are mis-transcribed.)    Tina Johnson PA-C   Comment: Please note this report has been produced using speech recognition software and may contain errors related to that system including errors in grammar, punctuation, and spelling, as well as words and phrases that may be inappropriate. If there are any questions or concerns please feel free to contact the dictating provider for clarification.         Tina Johnson PA-C  09/11/22 3151

## 2022-09-14 LAB
CULTURE: NORMAL
Lab: NORMAL
SPECIMEN: NORMAL
STREP A DIRECT SCREEN: NEGATIVE

## 2022-09-15 NOTE — PROGRESS NOTES
Pharmacy Note  ED Culture Follow-up    Mathew Sandhu is a 22 y.o. female. Allergies: Midol [acetaminophen], Pollen extract, and Cefazolin     Current antimicrobials:   Reviewed patient's throat culture - culture is negative. Patient was appropriately discharged on no antimicrobial therapy for strep throat. No further action needed. Please call with any questions.  Frederic Leyva [address] [place] [address], PharmD 10:16 AM 9/15/2022;

## 2022-10-25 ENCOUNTER — HOSPITAL ENCOUNTER (INPATIENT)
Age: 22
LOS: 3 days | Discharge: HOME | DRG: 282 | End: 2022-10-28
Attending: EMERGENCY MEDICINE | Admitting: INTERNAL MEDICINE
Payer: COMMERCIAL

## 2022-10-25 ENCOUNTER — APPOINTMENT (OUTPATIENT)
Dept: CT IMAGING | Age: 22
DRG: 282 | End: 2022-10-25
Payer: COMMERCIAL

## 2022-10-25 ENCOUNTER — APPOINTMENT (OUTPATIENT)
Dept: GENERAL RADIOLOGY | Age: 22
DRG: 282 | End: 2022-10-25
Payer: COMMERCIAL

## 2022-10-25 DIAGNOSIS — K85.90 ACUTE PANCREATITIS, UNSPECIFIED COMPLICATION STATUS, UNSPECIFIED PANCREATITIS TYPE: ICD-10-CM

## 2022-10-25 DIAGNOSIS — E11.9 TYPE 2 DIABETES MELLITUS WITHOUT COMPLICATION, WITHOUT LONG-TERM CURRENT USE OF INSULIN (HCC): ICD-10-CM

## 2022-10-25 DIAGNOSIS — K85.90 ACUTE PANCREATITIS WITHOUT INFECTION OR NECROSIS, UNSPECIFIED PANCREATITIS TYPE: ICD-10-CM

## 2022-10-25 DIAGNOSIS — E08.10 DIABETIC KETOACIDOSIS WITHOUT COMA ASSOCIATED WITH DIABETES MELLITUS DUE TO UNDERLYING CONDITION (HCC): Primary | ICD-10-CM

## 2022-10-25 LAB
ALBUMIN SERPL-MCNC: 4.2 GM/DL (ref 3.4–5)
ALP BLD-CCNC: 76 IU/L (ref 40–129)
ALT SERPL-CCNC: 61 U/L (ref 10–40)
ANION GAP SERPL CALCULATED.3IONS-SCNC: 11 MMOL/L (ref 4–16)
ANION GAP SERPL CALCULATED.3IONS-SCNC: 13 MMOL/L (ref 4–16)
ANION GAP SERPL CALCULATED.3IONS-SCNC: 16 MMOL/L (ref 4–16)
AST SERPL-CCNC: 33 IU/L (ref 15–37)
BACTERIA: ABNORMAL /HPF
BASE EXCESS: 4 (ref 0–2.4)
BASOPHILS ABSOLUTE: 0 K/CU MM
BASOPHILS RELATIVE PERCENT: 0.4 % (ref 0–1)
BETA-HYDROXYBUTYRATE: 18.3 MG/DL (ref 0–3)
BILIRUB SERPL-MCNC: 0.6 MG/DL (ref 0–1)
BILIRUBIN URINE: NEGATIVE MG/DL
BLOOD, URINE: NEGATIVE
BUN BLDV-MCNC: 10 MG/DL (ref 6–23)
BUN BLDV-MCNC: 8 MG/DL (ref 6–23)
BUN BLDV-MCNC: 8 MG/DL (ref 6–23)
CALCIUM SERPL-MCNC: 8.8 MG/DL (ref 8.3–10.6)
CALCIUM SERPL-MCNC: 9.1 MG/DL (ref 8.3–10.6)
CALCIUM SERPL-MCNC: 9.2 MG/DL (ref 8.3–10.6)
CHLORIDE BLD-SCNC: 100 MMOL/L (ref 99–110)
CHLORIDE BLD-SCNC: 103 MMOL/L (ref 99–110)
CHLORIDE BLD-SCNC: 103 MMOL/L (ref 99–110)
CHP ED QC CHECK: NORMAL
CHP ED QC CHECK: NORMAL
CHP ED QC CHECK: YES
CLARITY: CLEAR
CO2: 21 MMOL/L (ref 21–32)
CO2: 22 MMOL/L (ref 21–32)
CO2: 22 MMOL/L (ref 21–32)
COLOR: YELLOW
COMMENT: ABNORMAL
CREAT SERPL-MCNC: 0.5 MG/DL (ref 0.6–1.1)
CREAT SERPL-MCNC: 0.5 MG/DL (ref 0.6–1.1)
CREAT SERPL-MCNC: 0.6 MG/DL (ref 0.6–1.1)
DIFFERENTIAL TYPE: ABNORMAL
EOSINOPHILS ABSOLUTE: 0.3 K/CU MM
EOSINOPHILS RELATIVE PERCENT: 3.1 % (ref 0–3)
GFR SERPL CREATININE-BSD FRML MDRD: >60 ML/MIN/1.73M2
GLUCOSE BLD-MCNC: 181 MG/DL (ref 70–99)
GLUCOSE BLD-MCNC: 208 MG/DL (ref 70–99)
GLUCOSE BLD-MCNC: 213 MG/DL (ref 70–99)
GLUCOSE BLD-MCNC: 216 MG/DL
GLUCOSE BLD-MCNC: 216 MG/DL (ref 70–99)
GLUCOSE BLD-MCNC: 221 MG/DL (ref 70–99)
GLUCOSE BLD-MCNC: 248 MG/DL
GLUCOSE BLD-MCNC: 248 MG/DL (ref 70–99)
GLUCOSE BLD-MCNC: 259 MG/DL
GLUCOSE BLD-MCNC: 259 MG/DL (ref 70–99)
GLUCOSE BLD-MCNC: 321 MG/DL (ref 70–99)
GLUCOSE BLD-MCNC: 335 MG/DL (ref 70–99)
GLUCOSE BLD-MCNC: 347 MG/DL (ref 70–99)
GLUCOSE BLD-MCNC: 351 MG/DL (ref 70–99)
GLUCOSE, URINE: >1000 MG/DL
HCG QUALITATIVE: NEGATIVE
HCO3 VENOUS: 22.7 MMOL/L (ref 19–25)
HCT VFR BLD CALC: 45.9 % (ref 37–47)
HEMOGLOBIN: 15 GM/DL (ref 12.5–16)
IMMATURE NEUTROPHIL %: 0.2 % (ref 0–0.43)
KETONES, URINE: 40 MG/DL
LEUKOCYTE ESTERASE, URINE: NEGATIVE
LIPASE: 1299 IU/L (ref 13–60)
LYMPHOCYTES ABSOLUTE: 4.4 K/CU MM
LYMPHOCYTES RELATIVE PERCENT: 52.6 % (ref 24–44)
MAGNESIUM: 1.7 MG/DL (ref 1.8–2.4)
MAGNESIUM: 2 MG/DL (ref 1.8–2.4)
MCH RBC QN AUTO: 28.5 PG (ref 27–31)
MCHC RBC AUTO-ENTMCNC: 32.7 % (ref 32–36)
MCV RBC AUTO: 87.1 FL (ref 78–100)
MONOCYTES ABSOLUTE: 0.6 K/CU MM
MONOCYTES RELATIVE PERCENT: 6.6 % (ref 0–4)
MUCUS: ABNORMAL HPF
NITRITE URINE, QUANTITATIVE: NEGATIVE
NUCLEATED RBC %: 0 %
O2 SAT, VEN: 91.2 % (ref 50–70)
PCO2, VEN: 45 MMHG (ref 38–52)
PDW BLD-RTO: 12.9 % (ref 11.7–14.9)
PH VENOUS: 7.31 (ref 7.32–7.42)
PH, URINE: 5.5 (ref 5–8)
PHOSPHORUS: 3.4 MG/DL (ref 2.5–4.9)
PHOSPHORUS: 3.5 MG/DL (ref 2.5–4.9)
PLATELET # BLD: 210 K/CU MM (ref 140–440)
PMV BLD AUTO: 13.7 FL (ref 7.5–11.1)
PO2, VEN: 81 MMHG (ref 28–48)
POTASSIUM SERPL-SCNC: 3.9 MMOL/L (ref 3.5–5.1)
POTASSIUM SERPL-SCNC: 4 MMOL/L (ref 3.5–5.1)
POTASSIUM SERPL-SCNC: 4.1 MMOL/L (ref 3.5–5.1)
PROTEIN UA: ABNORMAL MG/DL
RBC # BLD: 5.27 M/CU MM (ref 4.2–5.4)
RBC URINE: <1 /HPF (ref 0–6)
REASON FOR REJECTION: NORMAL
REJECTED TEST: NORMAL
SEGMENTED NEUTROPHILS ABSOLUTE COUNT: 3.1 K/CU MM
SEGMENTED NEUTROPHILS RELATIVE PERCENT: 37.1 % (ref 36–66)
SODIUM BLD-SCNC: 136 MMOL/L (ref 135–145)
SODIUM BLD-SCNC: 137 MMOL/L (ref 135–145)
SODIUM BLD-SCNC: 138 MMOL/L (ref 135–145)
SPECIFIC GRAVITY UA: 1.02 (ref 1–1.03)
SQUAMOUS EPITHELIAL: 3 /HPF
TOTAL IMMATURE NEUTOROPHIL: 0.02 K/CU MM
TOTAL NUCLEATED RBC: 0 K/CU MM
TOTAL PROTEIN: 7.2 GM/DL (ref 6.4–8.2)
TRICHOMONAS: ABNORMAL /HPF
TRIGL SERPL-MCNC: 353 MG/DL
UROBILINOGEN, URINE: 0.2 MG/DL (ref 0.2–1)
WBC # BLD: 8.4 K/CU MM (ref 4–10.5)
WBC UA: 1 /HPF (ref 0–5)

## 2022-10-25 PROCEDURE — 84100 ASSAY OF PHOSPHORUS: CPT

## 2022-10-25 PROCEDURE — 84703 CHORIONIC GONADOTROPIN ASSAY: CPT

## 2022-10-25 PROCEDURE — 71046 X-RAY EXAM CHEST 2 VIEWS: CPT

## 2022-10-25 PROCEDURE — 81001 URINALYSIS AUTO W/SCOPE: CPT

## 2022-10-25 PROCEDURE — 83735 ASSAY OF MAGNESIUM: CPT

## 2022-10-25 PROCEDURE — 2580000003 HC RX 258: Performed by: EMERGENCY MEDICINE

## 2022-10-25 PROCEDURE — 96374 THER/PROPH/DIAG INJ IV PUSH: CPT

## 2022-10-25 PROCEDURE — 6360000002 HC RX W HCPCS: Performed by: EMERGENCY MEDICINE

## 2022-10-25 PROCEDURE — 96366 THER/PROPH/DIAG IV INF ADDON: CPT

## 2022-10-25 PROCEDURE — 84478 ASSAY OF TRIGLYCERIDES: CPT

## 2022-10-25 PROCEDURE — 1200000000 HC SEMI PRIVATE

## 2022-10-25 PROCEDURE — 74177 CT ABD & PELVIS W/CONTRAST: CPT

## 2022-10-25 PROCEDURE — 96376 TX/PRO/DX INJ SAME DRUG ADON: CPT

## 2022-10-25 PROCEDURE — 99285 EMERGENCY DEPT VISIT HI MDM: CPT

## 2022-10-25 PROCEDURE — 6370000000 HC RX 637 (ALT 250 FOR IP): Performed by: NURSE PRACTITIONER

## 2022-10-25 PROCEDURE — 80048 BASIC METABOLIC PNL TOTAL CA: CPT

## 2022-10-25 PROCEDURE — 83690 ASSAY OF LIPASE: CPT

## 2022-10-25 PROCEDURE — 96375 TX/PRO/DX INJ NEW DRUG ADDON: CPT

## 2022-10-25 PROCEDURE — 96365 THER/PROPH/DIAG IV INF INIT: CPT

## 2022-10-25 PROCEDURE — 85025 COMPLETE CBC W/AUTO DIFF WBC: CPT

## 2022-10-25 PROCEDURE — 82010 KETONE BODYS QUAN: CPT

## 2022-10-25 PROCEDURE — 6360000004 HC RX CONTRAST MEDICATION: Performed by: EMERGENCY MEDICINE

## 2022-10-25 PROCEDURE — 2580000003 HC RX 258: Performed by: NURSE PRACTITIONER

## 2022-10-25 PROCEDURE — 6370000000 HC RX 637 (ALT 250 FOR IP): Performed by: EMERGENCY MEDICINE

## 2022-10-25 PROCEDURE — 82962 GLUCOSE BLOOD TEST: CPT

## 2022-10-25 PROCEDURE — 80053 COMPREHEN METABOLIC PANEL: CPT

## 2022-10-25 PROCEDURE — 6360000002 HC RX W HCPCS: Performed by: NURSE PRACTITIONER

## 2022-10-25 PROCEDURE — 82805 BLOOD GASES W/O2 SATURATION: CPT

## 2022-10-25 RX ORDER — ONDANSETRON 2 MG/ML
4 INJECTION INTRAMUSCULAR; INTRAVENOUS EVERY 6 HOURS PRN
Status: DISCONTINUED | OUTPATIENT
Start: 2022-10-25 | End: 2022-10-28 | Stop reason: HOSPADM

## 2022-10-25 RX ORDER — POTASSIUM CHLORIDE 7.45 MG/ML
10 INJECTION INTRAVENOUS PRN
Status: DISCONTINUED | OUTPATIENT
Start: 2022-10-25 | End: 2022-10-25

## 2022-10-25 RX ORDER — PROMETHAZINE HYDROCHLORIDE 25 MG/1
12.5 TABLET ORAL EVERY 6 HOURS PRN
Status: DISCONTINUED | OUTPATIENT
Start: 2022-10-25 | End: 2022-10-28 | Stop reason: HOSPADM

## 2022-10-25 RX ORDER — POLYETHYLENE GLYCOL 3350 17 G/17G
17 POWDER, FOR SOLUTION ORAL DAILY PRN
Status: DISCONTINUED | OUTPATIENT
Start: 2022-10-25 | End: 2022-10-28 | Stop reason: HOSPADM

## 2022-10-25 RX ORDER — ENOXAPARIN SODIUM 100 MG/ML
40 INJECTION SUBCUTANEOUS 2 TIMES DAILY
Status: DISCONTINUED | OUTPATIENT
Start: 2022-10-25 | End: 2022-10-28 | Stop reason: HOSPADM

## 2022-10-25 RX ORDER — INSULIN GLARGINE 100 [IU]/ML
20 INJECTION, SOLUTION SUBCUTANEOUS NIGHTLY
Status: DISCONTINUED | OUTPATIENT
Start: 2022-10-25 | End: 2022-10-25

## 2022-10-25 RX ORDER — ALBUTEROL SULFATE 90 UG/1
2 AEROSOL, METERED RESPIRATORY (INHALATION) EVERY 6 HOURS PRN
Status: DISCONTINUED | OUTPATIENT
Start: 2022-10-25 | End: 2022-10-28 | Stop reason: HOSPADM

## 2022-10-25 RX ORDER — INSULIN LISPRO 100 [IU]/ML
4 INJECTION, SOLUTION INTRAVENOUS; SUBCUTANEOUS ONCE
Status: DISCONTINUED | OUTPATIENT
Start: 2022-10-25 | End: 2022-10-26

## 2022-10-25 RX ORDER — POLYETHYLENE GLYCOL 3350 17 G
2 POWDER IN PACKET (EA) ORAL
Status: DISCONTINUED | OUTPATIENT
Start: 2022-10-25 | End: 2022-10-28 | Stop reason: HOSPADM

## 2022-10-25 RX ORDER — 0.9 % SODIUM CHLORIDE 0.9 %
1000 INTRAVENOUS SOLUTION INTRAVENOUS ONCE
Status: COMPLETED | OUTPATIENT
Start: 2022-10-25 | End: 2022-10-25

## 2022-10-25 RX ORDER — SODIUM CHLORIDE 9 MG/ML
INJECTION, SOLUTION INTRAVENOUS PRN
Status: DISCONTINUED | OUTPATIENT
Start: 2022-10-25 | End: 2022-10-28 | Stop reason: HOSPADM

## 2022-10-25 RX ORDER — DEXTROSE MONOHYDRATE 100 MG/ML
INJECTION, SOLUTION INTRAVENOUS CONTINUOUS PRN
Status: DISCONTINUED | OUTPATIENT
Start: 2022-10-25 | End: 2022-10-28 | Stop reason: HOSPADM

## 2022-10-25 RX ORDER — DEXTROSE AND SODIUM CHLORIDE 5; .45 G/100ML; G/100ML
INJECTION, SOLUTION INTRAVENOUS CONTINUOUS PRN
Status: DISCONTINUED | OUTPATIENT
Start: 2022-10-25 | End: 2022-10-25

## 2022-10-25 RX ORDER — NICOTINE 21 MG/24HR
1 PATCH, TRANSDERMAL 24 HOURS TRANSDERMAL DAILY
Status: DISCONTINUED | OUTPATIENT
Start: 2022-10-26 | End: 2022-10-28 | Stop reason: HOSPADM

## 2022-10-25 RX ORDER — MAGNESIUM SULFATE 1 G/100ML
1000 INJECTION INTRAVENOUS PRN
Status: DISCONTINUED | OUTPATIENT
Start: 2022-10-25 | End: 2022-10-25

## 2022-10-25 RX ORDER — SODIUM CHLORIDE 0.9 % (FLUSH) 0.9 %
5-40 SYRINGE (ML) INJECTION EVERY 12 HOURS SCHEDULED
Status: DISCONTINUED | OUTPATIENT
Start: 2022-10-25 | End: 2022-10-28 | Stop reason: HOSPADM

## 2022-10-25 RX ORDER — METOCLOPRAMIDE HYDROCHLORIDE 5 MG/ML
10 INJECTION INTRAMUSCULAR; INTRAVENOUS EVERY 6 HOURS PRN
Status: DISCONTINUED | OUTPATIENT
Start: 2022-10-25 | End: 2022-10-27

## 2022-10-25 RX ORDER — INSULIN GLARGINE 100 [IU]/ML
10 INJECTION, SOLUTION SUBCUTANEOUS NIGHTLY
Status: DISCONTINUED | OUTPATIENT
Start: 2022-10-25 | End: 2022-10-26

## 2022-10-25 RX ORDER — SODIUM CHLORIDE, SODIUM LACTATE, POTASSIUM CHLORIDE, AND CALCIUM CHLORIDE .6; .31; .03; .02 G/100ML; G/100ML; G/100ML; G/100ML
1000 INJECTION, SOLUTION INTRAVENOUS ONCE
Status: COMPLETED | OUTPATIENT
Start: 2022-10-25 | End: 2022-10-25

## 2022-10-25 RX ORDER — INSULIN LISPRO 100 [IU]/ML
0-8 INJECTION, SOLUTION INTRAVENOUS; SUBCUTANEOUS EVERY 4 HOURS
Status: DISCONTINUED | OUTPATIENT
Start: 2022-10-25 | End: 2022-10-26

## 2022-10-25 RX ORDER — 0.9 % SODIUM CHLORIDE 0.9 %
15 INTRAVENOUS SOLUTION INTRAVENOUS ONCE
Status: COMPLETED | OUTPATIENT
Start: 2022-10-25 | End: 2022-10-25

## 2022-10-25 RX ORDER — SODIUM CHLORIDE 9 MG/ML
INJECTION, SOLUTION INTRAVENOUS CONTINUOUS
Status: DISCONTINUED | OUTPATIENT
Start: 2022-10-25 | End: 2022-10-28 | Stop reason: HOSPADM

## 2022-10-25 RX ORDER — SODIUM CHLORIDE 0.9 % (FLUSH) 0.9 %
5-40 SYRINGE (ML) INJECTION PRN
Status: DISCONTINUED | OUTPATIENT
Start: 2022-10-25 | End: 2022-10-28 | Stop reason: HOSPADM

## 2022-10-25 RX ADMIN — SODIUM CHLORIDE, POTASSIUM CHLORIDE, SODIUM LACTATE AND CALCIUM CHLORIDE 1000 ML: 600; 310; 30; 20 INJECTION, SOLUTION INTRAVENOUS at 15:32

## 2022-10-25 RX ADMIN — SODIUM CHLORIDE: 9 INJECTION, SOLUTION INTRAVENOUS at 22:50

## 2022-10-25 RX ADMIN — IOPAMIDOL 80 ML: 755 INJECTION, SOLUTION INTRAVENOUS at 12:49

## 2022-10-25 RX ADMIN — DEXTROSE AND SODIUM CHLORIDE: 5; 450 INJECTION, SOLUTION INTRAVENOUS at 16:50

## 2022-10-25 RX ADMIN — SODIUM CHLORIDE 2382 ML: 9 INJECTION, SOLUTION INTRAVENOUS at 14:10

## 2022-10-25 RX ADMIN — INSULIN GLARGINE 10 UNITS: 100 INJECTION, SOLUTION SUBCUTANEOUS at 22:50

## 2022-10-25 RX ADMIN — HYDROMORPHONE HYDROCHLORIDE 0.5 MG: 1 INJECTION, SOLUTION INTRAMUSCULAR; INTRAVENOUS; SUBCUTANEOUS at 22:26

## 2022-10-25 RX ADMIN — HYDROMORPHONE HYDROCHLORIDE 1 MG: 1 INJECTION, SOLUTION INTRAMUSCULAR; INTRAVENOUS; SUBCUTANEOUS at 16:29

## 2022-10-25 RX ADMIN — SODIUM CHLORIDE 1000 ML: 9 INJECTION, SOLUTION INTRAVENOUS at 12:04

## 2022-10-25 RX ADMIN — SODIUM CHLORIDE 0.1 UNITS/KG/HR: 9 INJECTION, SOLUTION INTRAVENOUS at 14:12

## 2022-10-25 RX ADMIN — ENOXAPARIN SODIUM 40 MG: 40 INJECTION SUBCUTANEOUS at 22:27

## 2022-10-25 RX ADMIN — INSULIN HUMAN 5 UNITS: 100 INJECTION, SOLUTION PARENTERAL at 14:04

## 2022-10-25 RX ADMIN — HYDROMORPHONE HYDROCHLORIDE 1 MG: 1 INJECTION, SOLUTION INTRAMUSCULAR; INTRAVENOUS; SUBCUTANEOUS at 12:08

## 2022-10-25 RX ADMIN — SODIUM CHLORIDE, PRESERVATIVE FREE 10 ML: 5 INJECTION INTRAVENOUS at 22:32

## 2022-10-25 ASSESSMENT — PAIN SCALES - GENERAL
PAINLEVEL_OUTOF10: 10
PAINLEVEL_OUTOF10: 9
PAINLEVEL_OUTOF10: 7
PAINLEVEL_OUTOF10: 10
PAINLEVEL_OUTOF10: 7
PAINLEVEL_OUTOF10: 9
PAINLEVEL_OUTOF10: 9

## 2022-10-25 ASSESSMENT — PAIN DESCRIPTION - ORIENTATION
ORIENTATION: RIGHT;ANTERIOR
ORIENTATION: RIGHT;UPPER
ORIENTATION: LEFT;RIGHT;UPPER
ORIENTATION: MID;RIGHT

## 2022-10-25 ASSESSMENT — PAIN - FUNCTIONAL ASSESSMENT
PAIN_FUNCTIONAL_ASSESSMENT: PREVENTS OR INTERFERES SOME ACTIVE ACTIVITIES AND ADLS
PAIN_FUNCTIONAL_ASSESSMENT: 0-10

## 2022-10-25 ASSESSMENT — PAIN DESCRIPTION - LOCATION
LOCATION: ABDOMEN

## 2022-10-25 ASSESSMENT — PAIN DESCRIPTION - DESCRIPTORS
DESCRIPTORS: STABBING
DESCRIPTORS: ACHING;DISCOMFORT

## 2022-10-25 ASSESSMENT — ENCOUNTER SYMPTOMS
EYE REDNESS: 0
DIARRHEA: 1
VOMITING: 1
NAUSEA: 1
ABDOMINAL PAIN: 1
COUGH: 0
SHORTNESS OF BREATH: 0

## 2022-10-25 ASSESSMENT — PAIN DESCRIPTION - PAIN TYPE: TYPE: ACUTE PAIN

## 2022-10-25 ASSESSMENT — PAIN DESCRIPTION - FREQUENCY: FREQUENCY: CONTINUOUS

## 2022-10-25 NOTE — ED NOTES
ED TO INPATIENT SBAR HANDOFF    Patient Name: Lexi Mcneill   :  2000  22 y.o. MRN:  [id number]  Preferred Name  Heather Almaraz  ED Room #:  ED20/ED-20  Family/Caregiver Present no   Restraints no   Sitter no   Sepsis Risk Score Sepsis Risk Score: 0.38    Situation  Code Status: No Order No additional code details. Allergies: Midol [acetaminophen], Pollen extract, and Cefazolin  Weight: Patient Vitals for the past 96 hrs (Last 3 readings):   Weight   10/25/22 1052 (!) 350 lb (158.8 kg)     Arrived from: home  Chief Complaint:   Chief Complaint   Patient presents with    Abdominal Pain     Brought in via EMS for abd pain starting this morning; blood glucose was 386 prior to arrival; denies hx of DM     Hospital Problem/Diagnosis:  Active Problems:    * No active hospital problems. *  Resolved Problems:    * No resolved hospital problems. *    Imaging:   CT ABDOMEN PELVIS W IV CONTRAST Additional Contrast? None   Final Result   Soft tissue stranding in the region of the head of the pancreas suspicious   for acute pancreatitis. Recommend correlation with amylase and lipase. XR CHEST (2 VW)   Preliminary Result   Low lung volumes without acute cardiopulmonary findings.            Abnormal labs:   Abnormal Labs Reviewed   CBC WITH AUTO DIFFERENTIAL - Abnormal; Notable for the following components:       Result Value    MPV 13.7 (*)     Lymphocytes % 52.6 (*)     Monocytes % 6.6 (*)     Eosinophils % 3.1 (*)     All other components within normal limits   LIPASE - Abnormal; Notable for the following components:    Lipase 1,299 (*)     All other components within normal limits   COMPREHENSIVE METABOLIC PANEL - Abnormal; Notable for the following components:    Glucose 347 (*)     ALT 61 (*)     All other components within normal limits   URINALYSIS - Abnormal; Notable for the following components:    Glucose, Urine >1,000 (*)     Ketones, Urine 40 (*)     Protein, UA TRACE (*)     All other components within normal limits   BLOOD GAS, VENOUS - Abnormal; Notable for the following components:    pH, Jorge 7.31 (*)     pO2, Jorge 81 (*)     Base Excess 4 (*)     O2 Sat, Jorge 91.2 (*)     All other components within normal limits   BETA-HYDROXYBUTYRATE - Abnormal; Notable for the following components:    Beta-Hydroxybutyrate 18.3 (*)     All other components within normal limits   MICROSCOPIC URINALYSIS - Abnormal; Notable for the following components:    Bacteria, UA RARE (*)     Mucus, UA RARE (*)     All other components within normal limits   POCT GLUCOSE - Abnormal; Notable for the following components:    POC Glucose 335 (*)     All other components within normal limits   POCT GLUCOSE - Abnormal; Notable for the following components:    POC Glucose 351 (*)     All other components within normal limits   POCT GLUCOSE - Abnormal; Notable for the following components:    POC Glucose 321 (*)     All other components within normal limits   POCT GLUCOSE - Abnormal; Notable for the following components:    POC Glucose 259 (*)     All other components within normal limits     Critical values: yes     Abnormal Assessment Findings: elevated blood glucose    Background  History:   Past Medical History:   Diagnosis Date    ADHD (attention deficit hyperactivity disorder)     ADHD (attention deficit hyperactivity disorder)     Reflux        Assessment    Vitals/MEWS: MEWS Score: 0  Level of Consciousness: Alert (0)   Vitals:    10/25/22 1052 10/25/22 1534   BP: 132/72 121/66   Pulse: 81 89   Resp: 22 14   Temp: 97.6 °F (36.4 °C) 98 °F (36.7 °C)   TempSrc: Oral Oral   SpO2: 96% 95%   Weight: (!) 350 lb (158.8 kg)    Height: 5' 4\" (1.626 m)    10  FiO2 (%): none  O2 Flow Rate: O2 Device: None (Room air)    Cardiac Rhythm:    Pain Assessment: 10/10 [x] Verbal [] Fabien Route Scale  Pain Scale: Pain Assessment  Pain Assessment: 0-10  Pain Level: 9  Pain Orientation: Left, Right, Upper  Pain Descriptors: Stabbing  Pain Type: Acute pain  Pain Frequency: Continuous  Last documented pain score (0-10 scale) Pain Level: 9  Last documented pain medication administered: 1208  Mental Status: oriented, alert, coherent, logical, thought processes intact, and able to concentrate and follow conversation  NIH Score:    C-SSRS: Risk of Suicide: No Risk  Bedside swallow:    Urszula Coma Scale (GCS): Active LDA's:   Peripheral IV 10/25/22 Right Antecubital (Active)       Peripheral IV 10/25/22 Right Hand (Active)     PO Status: unknown  Pertinent or High Risk Medications/Drips: yes   If Yes, please provide details: insulin  Pending Blood Product Administration: no     You may also review the ED PT Care Timeline found under the Summary Nursing Index tab. Recommendation    Pending orders admission orders  Plan for Discharge (if known): Additional Comments: none   If any further questions, please call Sending RN at [phone]    Electronically signed by: Electronically signed by Mar Gutiérrez RN on 10/25/2022 at 3:36 PM      Mar Gutiérrez RN  10/25/22 8839

## 2022-10-25 NOTE — ED NOTES
Medication History  [facility]    Patient Name: Jamison Gonzales 2000     Medication history has been completed by: Ashley Xie CPhT    Source(s) of information: patient and insurance claims [facility]    Primary Care Physician: Paola Segovia MD     Pharmacy: [facility]    Allergies as of 10/25/2022 - Fully Reviewed 10/25/2022   Allergen Reaction Noted    Midol [acetaminophen] Shortness Of Breath and Rash 12/03/2019    Pollen extract Anaphylaxis 06/27/2013    Cefazolin  11/23/2011        Prior to Admission medications    Medication Sig Start Date End Date Taking? Authorizing Provider   albuterol sulfate  (90 Base) MCG/ACT inhaler Inhale 1-2 puffs into the lungs every 6 hours as needed for Wheezing 9/11/21   Ronn Mcmullen PA-C     Medications removed from list (include reason, ex. noncompliance, medication cost, therapy complete etc.):   Albuterol inhaler duplicate  Ondansetron duplicate  Adderall no longer taking  Cetirizine no longer taking  Dicyclomine no longer taking  Famotidine no longer taking  Fluoxetine no longer taking  Lidocaine no longer taking  Loratadine no longer taking  Melatonin no longer taking  Ondansetron no longer taking  Ketorolac discontinued 09/11/2021  Naproxen discontinued 09/11/2021    Comments:  Patient states she only has a inhaler at home. Recent claims for clindamycin patient states she did not finish this therapy.     To my knowledge the above medication history is accurate as of 10/25/2022 4:09 PM.   Ashley Xie CPhT   10/25/2022 4:09 PM

## 2022-10-25 NOTE — ED NOTES
Per report patient c/o severe LUQ pain with nausea and vomiting this morning with diarrhea for last week. Blood glucose 386 per EMS. Patient has  A slow healing wound to left rib/ flank area. Deuce Spencer.  [place] Gutiérrez  10/25/22 1021

## 2022-10-25 NOTE — ED NOTES
Red and green top drawn for a third time and sent to lab     Joselo Chang.  House, RN  10/25/22 9022

## 2022-10-25 NOTE — ED PROVIDER NOTES
[address] ENCOUNTER      Pt Name: Jay Bailon  MRN: [id number]  [place] 2000  Date of evaluation: 10/25/2022  Provider: Romel Rendon MD    CHIEF COMPLAINT       Chief Complaint   Patient presents with    Abdominal Pain     Brought in via EMS for abd pain starting this morning; blood glucose was 386 prior to arrival; denies hx of DM         HISTORY OF PRESENT ILLNESS    HPI    Nursing Notes were reviewed. Jay Bailon is a 22 y.o. female who presents to the emergency department with severe epigastric pain. Is a 20-year-old woman who comes to the emergency department complaining of waxing and waning midepigastric pain. The patient says the pain started on the right flank and radiated to the right upper quadrant and has migrated to her mid epigastrium. Patient also describes pain in her left upper quadrant as well. Drives nausea, vomiting and watery diarrhea. She denies recent fevers. She denies distinct substernal chest pain or difficulty breathing. She denies dysuria but does describe frequency. She denies new vaginal discharge or irregular menstrual bleeding. Patient describes the pain as 10 out of 10 and is severe. Is having difficulty identifying a specific foci/location of pain but describes the pain is generally across her upper abdomen most intensely concentrated in her mid epigastrium. EMS reports the patient's blood sugar was over 300. The patient denies a history of diabetes. REVIEW OF SYSTEMS       Review of Systems    10 Systems were reviewed with this patient and all were negative with exception of those noted in the history of present illness above.       PAST MEDICAL HISTORY     Past Medical History:   Diagnosis Date    ADHD (attention deficit hyperactivity disorder)     ADHD (attention deficit hyperactivity disorder)     Reflux          SURGICAL HISTORY     History reviewed. No pertinent surgical history. CURRENT MEDICATIONS       Previous Medications    ALBUTEROL SULFATE HFA (PROVENTIL HFA) 108 (90 BASE) MCG/ACT INHALER    Inhale 2 puffs into the lungs every 4 hours as needed for Wheezing or Shortness of Breath With spacer (and mask if indicated). Thanks. ALBUTEROL SULFATE  (90 BASE) MCG/ACT INHALER    Inhale 1-2 puffs into the lungs every 6 hours as needed for Wheezing    AMPHETAMINE-DEXTROAMPHETAMINE (ADDERALL) 30 MG TABLET    Take 30 mg by mouth daily. AMPHETAMINE-DEXTROAMPHETAMINE (ADDERALL, 10MG,) 10 MG TABLET    Take 10 mg by mouth daily. CETIRIZINE (ZYRTEC ALLERGY) 10 MG TABLET    Take 10 mg by mouth daily. DICYCLOMINE (BENTYL) 10 MG CAPSULE    Take 1 capsule by mouth 4 times daily (before meals and nightly)    FAMOTIDINE (PEPCID) 20 MG TABLET    Take 1 tablet by mouth 2 times daily    FLUOXETINE (PROZAC) 20 MG CAPSULE    Take 40 mg by mouth daily    LIDOCAINE (LIDODERM) 5 %    Place 1 patch onto the skin daily 12 hours on, 12 hours off. LORATADINE 10 MG CAPS    Take  by mouth. MELATONIN 3-10 MG TABS    Take  by mouth. ONDANSETRON (ZOFRAN ODT) 4 MG DISINTEGRATING TABLET    Take 1 tablet by mouth every 8 hours as needed for Nausea    ONDANSETRON (ZOFRAN) 4 MG TABLET    Take 1 tablet by mouth every 8 hours as needed for Nausea       ALLERGIES     Midol [acetaminophen], Pollen extract, and Cefazolin    FAMILY HISTORY     History reviewed. No pertinent family history.        SOCIAL HISTORY       Social History     Socioeconomic History    Marital status: Single     Spouse name: None    Number of children: None    Years of education: None    Highest education level: None   Tobacco Use    Smoking status: Every Day     Packs/day: 0.50     Types: Cigarettes    Smokeless tobacco: Current     Types: Chew   Substance and Sexual Activity    Alcohol use: Yes     Comment: occasional    Drug use: Yes     Types: Marijuana Pricilla Ro)    Sexual activity: Yes     Partners: Female       SCREENINGS                               CIWA Assessment  BP: 132/72  Heart Rate: 81                 PHYSICAL EXAM       ED Triage Vitals [10/25/22 1052]   BP Temp Temp Source Heart Rate Resp SpO2 Height Weight   132/72 97.6 °F (36.4 °C) Oral 81 22 96 % 5' 4\" (1.626 m) (!) 350 lb (158.8 kg)       Physical Exam  Vitals and nursing note reviewed. Constitutional:       General: She is in acute distress. Appearance: She is well-developed. She is obese. She is not ill-appearing, toxic-appearing or diaphoretic. HENT:      Head: Normocephalic and atraumatic. Eyes:      Pupils: Pupils are equal, round, and reactive to light. Cardiovascular:      Rate and Rhythm: Normal rate and regular rhythm. Heart sounds: Normal heart sounds. No murmur heard. Pulmonary:      Effort: Pulmonary effort is normal. No respiratory distress. Abdominal:      Palpations: Abdomen is soft. Tenderness: There is generalized abdominal tenderness and tenderness in the right upper quadrant, epigastric area and left upper quadrant. There is guarding. There is no right CVA tenderness or left CVA tenderness. Skin:     General: Skin is warm and dry. Capillary Refill: Capillary refill takes less than 2 seconds. Neurological:      General: No focal deficit present. Mental Status: She is alert and oriented to person, place, and time.    Psychiatric:         Behavior: Behavior normal.       DIAGNOSTIC RESULTS     RADIOLOGY:   Non-plain film images such as CT, Ultrasound and MRI are read by the radiologist. Plain radiographic images are visualized and preliminarily interpreted by the emergency physician with the below findings:    Interpretation per the Radiologist below, if available at the time of this note:    CT ABDOMEN PELVIS W IV CONTRAST Additional Contrast? None   Final Result   Soft tissue stranding in the region of the head of the pancreas suspicious   for acute pancreatitis. Recommend correlation with amylase and lipase. XR CHEST (2 VW)   Preliminary Result   Low lung volumes without acute cardiopulmonary findings.              LABS:  Labs Reviewed   CBC WITH AUTO DIFFERENTIAL - Abnormal; Notable for the following components:       Result Value    MPV 13.7 (*)     Lymphocytes % 52.6 (*)     Monocytes % 6.6 (*)     Eosinophils % 3.1 (*)     All other components within normal limits   LIPASE - Abnormal; Notable for the following components:    Lipase 1,299 (*)     All other components within normal limits   COMPREHENSIVE METABOLIC PANEL - Abnormal; Notable for the following components:    Glucose 347 (*)     ALT 61 (*)     All other components within normal limits   URINALYSIS - Abnormal; Notable for the following components:    Glucose, Urine >1,000 (*)     Ketones, Urine 40 (*)     Protein, UA TRACE (*)     All other components within normal limits   BLOOD GAS, VENOUS - Abnormal; Notable for the following components:    pH, Jorge 7.31 (*)     pO2, Jorge 81 (*)     Base Excess 4 (*)     O2 Sat, Jorge 91.2 (*)     All other components within normal limits   BETA-HYDROXYBUTYRATE - Abnormal; Notable for the following components:    Beta-Hydroxybutyrate 18.3 (*)     All other components within normal limits   MICROSCOPIC URINALYSIS - Abnormal; Notable for the following components:    Bacteria, UA RARE (*)     Mucus, UA RARE (*)     All other components within normal limits   POCT GLUCOSE - Abnormal; Notable for the following components:    POC Glucose 335 (*)     All other components within normal limits   POCT GLUCOSE - Abnormal; Notable for the following components:    POC Glucose 351 (*)     All other components within normal limits   POCT GLUCOSE - Abnormal; Notable for the following components:    POC Glucose 321 (*)     All other components within normal limits   HCG, SERUM, QUALITATIVE   BASIC METABOLIC PANEL   BASIC METABOLIC PANEL   BASIC METABOLIC PANEL   MAGNESIUM MAGNESIUM   MAGNESIUM   PHOSPHORUS   PHOSPHORUS   PHOSPHORUS   POCT GLUCOSE   POCT GLUCOSE   POCT GLUCOSE   POCT GLUCOSE   POCT GLUCOSE   POCT GLUCOSE   POCT GLUCOSE   POCT GLUCOSE   POCT GLUCOSE   POCT GLUCOSE   POCT GLUCOSE   POCT GLUCOSE   POCT GLUCOSE       All other labs were within normal range or not returned as of this dictation. EMERGENCY DEPARTMENT COURSE and DIFFERENTIAL DIAGNOSIS/MDM:   Vitals:    Vitals:    10/25/22 1052   BP: 132/72   Pulse: 81   Resp: 22   Temp: 97.6 °F (36.4 °C)   TempSrc: Oral   SpO2: 96%   Weight: (!) 350 lb (158.8 kg)   Height: 5' 4\" (1.626 m)       MDM  Patient's blood sugar was significantly elevated on arrival.  This raises the concern for possible diabetic ketoacidosis and a new onset diabetic. Additional considerations in this patient include intra-abdominal pathology such as biliary colic, cholecystitis, pancreatitis, peptic ulcer disease, with or without perforation. Will be evaluated laboratory evaluation as well as CT imaging. She will receive IV fluid hydration, analgesia and insulin for her significant hyperglycemia. REASSESSMENT      Signs symptoms and laboratory evaluation are consistent with diabetic ketoacidosis. The patient has been given aggressive fluid rehydration, as well as placed on insulin drip. Laboratory evaluation and CT imaging are also consistent with pancreatitis. The patient will need to admitted to the hospital and admitted to the ICU for continuous insulin therapy due to her diabetic ketoacidosis. CRITICAL CARE TIME   Total Critical Care time was 30 minutes, excluding separately reportable procedures. There was a high probability of clinically significant/life threatening deterioration in the patient's condition which required my urgent intervention.       CONSULTS:  IP CONSULT TO DIABETES EDUCATOR  IP CONSULT TO CRITICAL CARE    PROCEDURES:  Unless otherwise noted below, none     Procedures      FINAL IMPRESSION      1. Diabetic ketoacidosis without coma associated with diabetes mellitus due to underlying condition ([facility] [address]    2. Acute pancreatitis, unspecified complication status, unspecified pancreatitis type          DISPOSITION/PLAN   DISPOSITION Decision To Admit 10/25/2022 02:12:00 PM      PATIENT REFERRED TO:  No follow-up provider specified. DISCHARGE MEDICATIONS:  New Prescriptions    No medications on file     Controlled Substances Monitoring:     No flowsheet data found.     Ronnell Marcano MD (electronically signed)  Attending Emergency Physician            Ronnell Marcano MD  10/25/22 2500

## 2022-10-25 NOTE — ED NOTES
Per Dr. Leavitt Nurse discontinue insulin drip due to lab levels.       Foster Villarreal, RN  10/25/22 1944

## 2022-10-25 NOTE — ED NOTES
Pt requesting pain medication for abdominal pain at this time. Dr. Diana Harmon notified, verbal orders for 1mg dilaudid IV one time.        Minh Borjas RN  10/25/22 2299

## 2022-10-25 NOTE — LETTER
[address] [address] [place] [address]  Phone: [phone]             October 28, 2022    Patient: Carlito Kendrick   YOB: 2000   Date of Visit: 10/25/2022       To Whom It May Concern:    Cosmo Yin was seen and treated in our facility  beginning 10/25/2022 until 10/28/2022. She may return to work on 11/01/2022.       Sincerely,       Mayank Delgado RN         Signature:__________________________________

## 2022-10-26 ENCOUNTER — APPOINTMENT (OUTPATIENT)
Dept: ULTRASOUND IMAGING | Age: 22
DRG: 282 | End: 2022-10-26
Payer: COMMERCIAL

## 2022-10-26 LAB
ALBUMIN SERPL-MCNC: 3.9 GM/DL (ref 3.4–5)
ALBUMIN SERPL-MCNC: 3.9 GM/DL (ref 3.4–5)
ALP BLD-CCNC: 65 IU/L (ref 40–128)
ALP BLD-CCNC: 65 IU/L (ref 40–129)
ALT SERPL-CCNC: 49 U/L (ref 10–40)
ALT SERPL-CCNC: 49 U/L (ref 10–40)
ANION GAP SERPL CALCULATED.3IONS-SCNC: 10 MMOL/L (ref 4–16)
ANION GAP SERPL CALCULATED.3IONS-SCNC: 11 MMOL/L (ref 4–16)
AST SERPL-CCNC: 27 IU/L (ref 15–37)
AST SERPL-CCNC: 27 IU/L (ref 15–37)
BASOPHILS ABSOLUTE: 0 K/CU MM
BASOPHILS RELATIVE PERCENT: 0.2 % (ref 0–1)
BILIRUB SERPL-MCNC: 0.8 MG/DL (ref 0–1)
BILIRUB SERPL-MCNC: 0.8 MG/DL (ref 0–1)
BILIRUBIN DIRECT: 0.2 MG/DL (ref 0–0.3)
BILIRUBIN, INDIRECT: 0.6 MG/DL (ref 0–0.7)
BUN BLDV-MCNC: 8 MG/DL (ref 6–23)
BUN BLDV-MCNC: 8 MG/DL (ref 6–23)
CALCIUM SERPL-MCNC: 8.9 MG/DL (ref 8.3–10.6)
CALCIUM SERPL-MCNC: 9 MG/DL (ref 8.3–10.6)
CHLORIDE BLD-SCNC: 104 MMOL/L (ref 99–110)
CHLORIDE BLD-SCNC: 104 MMOL/L (ref 99–110)
CO2: 25 MMOL/L (ref 21–32)
CO2: 25 MMOL/L (ref 21–32)
CREAT SERPL-MCNC: 0.6 MG/DL (ref 0.6–1.1)
CREAT SERPL-MCNC: 0.6 MG/DL (ref 0.6–1.1)
DIFFERENTIAL TYPE: ABNORMAL
EOSINOPHILS ABSOLUTE: 0.1 K/CU MM
EOSINOPHILS RELATIVE PERCENT: 1.4 % (ref 0–3)
ESTIMATED AVERAGE GLUCOSE: 232 MG/DL
GFR SERPL CREATININE-BSD FRML MDRD: >60 ML/MIN/1.73M2
GFR SERPL CREATININE-BSD FRML MDRD: >60 ML/MIN/1.73M2
GLUCOSE BLD-MCNC: 166 MG/DL (ref 70–99)
GLUCOSE BLD-MCNC: 202 MG/DL (ref 70–99)
GLUCOSE BLD-MCNC: 219 MG/DL (ref 70–99)
GLUCOSE BLD-MCNC: 222 MG/DL (ref 70–99)
GLUCOSE BLD-MCNC: 222 MG/DL (ref 70–99)
GLUCOSE BLD-MCNC: 223 MG/DL (ref 70–99)
GLUCOSE BLD-MCNC: 228 MG/DL (ref 70–99)
GLUCOSE BLD-MCNC: 278 MG/DL (ref 70–99)
HBA1C MFR BLD: 9.7 % (ref 4.2–6.3)
HCT VFR BLD CALC: 44.5 % (ref 37–47)
HEMOGLOBIN: 14.4 GM/DL (ref 12.5–16)
IMMATURE NEUTROPHIL %: 0.2 % (ref 0–0.43)
LACTATE: 1.3 MMOL/L (ref 0.4–2)
LIPASE: 226 IU/L (ref 13–60)
LYMPHOCYTES ABSOLUTE: 2.4 K/CU MM
LYMPHOCYTES RELATIVE PERCENT: 29.7 % (ref 24–44)
MCH RBC QN AUTO: 28.9 PG (ref 27–31)
MCHC RBC AUTO-ENTMCNC: 32.4 % (ref 32–36)
MCV RBC AUTO: 89.2 FL (ref 78–100)
MONOCYTES ABSOLUTE: 0.5 K/CU MM
MONOCYTES RELATIVE PERCENT: 6.1 % (ref 0–4)
NUCLEATED RBC %: 0 %
PDW BLD-RTO: 13.2 % (ref 11.7–14.9)
PLATELET # BLD: 192 K/CU MM (ref 140–440)
PMV BLD AUTO: 13.2 FL (ref 7.5–11.1)
POTASSIUM SERPL-SCNC: 3.8 MMOL/L (ref 3.5–5.1)
POTASSIUM SERPL-SCNC: 4 MMOL/L (ref 3.5–5.1)
RBC # BLD: 4.99 M/CU MM (ref 4.2–5.4)
SEGMENTED NEUTROPHILS ABSOLUTE COUNT: 5 K/CU MM
SEGMENTED NEUTROPHILS RELATIVE PERCENT: 62.4 % (ref 36–66)
SODIUM BLD-SCNC: 139 MMOL/L (ref 135–145)
SODIUM BLD-SCNC: 140 MMOL/L (ref 135–145)
TOTAL IMMATURE NEUTOROPHIL: 0.02 K/CU MM
TOTAL NUCLEATED RBC: 0 K/CU MM
TOTAL PROTEIN: 6.1 GM/DL (ref 6.4–8.2)
TOTAL PROTEIN: 6.1 GM/DL (ref 6.4–8.2)
TRIGL SERPL-MCNC: 391 MG/DL
WBC # BLD: 8 K/CU MM (ref 4–10.5)

## 2022-10-26 PROCEDURE — 83735 ASSAY OF MAGNESIUM: CPT

## 2022-10-26 PROCEDURE — 6360000002 HC RX W HCPCS: Performed by: NURSE PRACTITIONER

## 2022-10-26 PROCEDURE — 2580000003 HC RX 258: Performed by: NURSE PRACTITIONER

## 2022-10-26 PROCEDURE — 6370000000 HC RX 637 (ALT 250 FOR IP): Performed by: STUDENT IN AN ORGANIZED HEALTH CARE EDUCATION/TRAINING PROGRAM

## 2022-10-26 PROCEDURE — 83690 ASSAY OF LIPASE: CPT

## 2022-10-26 PROCEDURE — 6370000000 HC RX 637 (ALT 250 FOR IP): Performed by: NURSE PRACTITIONER

## 2022-10-26 PROCEDURE — 84100 ASSAY OF PHOSPHORUS: CPT

## 2022-10-26 PROCEDURE — 80048 BASIC METABOLIC PNL TOTAL CA: CPT

## 2022-10-26 PROCEDURE — 82962 GLUCOSE BLOOD TEST: CPT

## 2022-10-26 PROCEDURE — 94761 N-INVAS EAR/PLS OXIMETRY MLT: CPT

## 2022-10-26 PROCEDURE — 2580000003 HC RX 258: Performed by: STUDENT IN AN ORGANIZED HEALTH CARE EDUCATION/TRAINING PROGRAM

## 2022-10-26 PROCEDURE — 76705 ECHO EXAM OF ABDOMEN: CPT

## 2022-10-26 PROCEDURE — 80053 COMPREHEN METABOLIC PANEL: CPT

## 2022-10-26 PROCEDURE — 2060000000 HC ICU INTERMEDIATE R&B

## 2022-10-26 PROCEDURE — 76937 US GUIDE VASCULAR ACCESS: CPT

## 2022-10-26 PROCEDURE — 85025 COMPLETE CBC W/AUTO DIFF WBC: CPT

## 2022-10-26 PROCEDURE — 83605 ASSAY OF LACTIC ACID: CPT

## 2022-10-26 PROCEDURE — 83036 HEMOGLOBIN GLYCOSYLATED A1C: CPT

## 2022-10-26 PROCEDURE — 80076 HEPATIC FUNCTION PANEL: CPT

## 2022-10-26 PROCEDURE — 36415 COLL VENOUS BLD VENIPUNCTURE: CPT

## 2022-10-26 PROCEDURE — 84478 ASSAY OF TRIGLYCERIDES: CPT

## 2022-10-26 RX ORDER — INSULIN LISPRO 100 [IU]/ML
0-4 INJECTION, SOLUTION INTRAVENOUS; SUBCUTANEOUS NIGHTLY
Status: DISCONTINUED | OUTPATIENT
Start: 2022-10-26 | End: 2022-10-28 | Stop reason: HOSPADM

## 2022-10-26 RX ORDER — INSULIN LISPRO 100 [IU]/ML
0-16 INJECTION, SOLUTION INTRAVENOUS; SUBCUTANEOUS EVERY 4 HOURS
Status: DISCONTINUED | OUTPATIENT
Start: 2022-10-26 | End: 2022-10-26

## 2022-10-26 RX ORDER — INSULIN LISPRO 100 [IU]/ML
0-16 INJECTION, SOLUTION INTRAVENOUS; SUBCUTANEOUS
Status: DISCONTINUED | OUTPATIENT
Start: 2022-10-26 | End: 2022-10-28 | Stop reason: HOSPADM

## 2022-10-26 RX ORDER — INSULIN GLARGINE 100 [IU]/ML
12 INJECTION, SOLUTION SUBCUTANEOUS NIGHTLY
Status: CANCELLED | OUTPATIENT
Start: 2022-10-27

## 2022-10-26 RX ORDER — INSULIN GLARGINE 100 [IU]/ML
15 INJECTION, SOLUTION SUBCUTANEOUS NIGHTLY
Status: DISCONTINUED | OUTPATIENT
Start: 2022-10-26 | End: 2022-10-28 | Stop reason: HOSPADM

## 2022-10-26 RX ADMIN — SODIUM CHLORIDE: 9 INJECTION, SOLUTION INTRAVENOUS at 06:23

## 2022-10-26 RX ADMIN — INSULIN GLARGINE 15 UNITS: 100 INJECTION, SOLUTION SUBCUTANEOUS at 20:59

## 2022-10-26 RX ADMIN — HYDROMORPHONE HYDROCHLORIDE 0.5 MG: 1 INJECTION, SOLUTION INTRAMUSCULAR; INTRAVENOUS; SUBCUTANEOUS at 21:02

## 2022-10-26 RX ADMIN — SODIUM CHLORIDE, PRESERVATIVE FREE 10 ML: 5 INJECTION INTRAVENOUS at 21:08

## 2022-10-26 RX ADMIN — HYDROMORPHONE HYDROCHLORIDE 0.5 MG: 1 INJECTION, SOLUTION INTRAMUSCULAR; INTRAVENOUS; SUBCUTANEOUS at 06:14

## 2022-10-26 RX ADMIN — ENOXAPARIN SODIUM 40 MG: 40 INJECTION SUBCUTANEOUS at 20:59

## 2022-10-26 RX ADMIN — INSULIN LISPRO 2 UNITS: 100 INJECTION, SOLUTION INTRAVENOUS; SUBCUTANEOUS at 14:12

## 2022-10-26 RX ADMIN — ENOXAPARIN SODIUM 40 MG: 40 INJECTION SUBCUTANEOUS at 08:35

## 2022-10-26 RX ADMIN — INSULIN LISPRO 2 UNITS: 100 INJECTION, SOLUTION INTRAVENOUS; SUBCUTANEOUS at 06:15

## 2022-10-26 RX ADMIN — SODIUM CHLORIDE: 9 INJECTION, SOLUTION INTRAVENOUS at 22:02

## 2022-10-26 RX ADMIN — INSULIN LISPRO 4 UNITS: 100 INJECTION, SOLUTION INTRAVENOUS; SUBCUTANEOUS at 17:54

## 2022-10-26 RX ADMIN — HYDROMORPHONE HYDROCHLORIDE 0.5 MG: 1 INJECTION, SOLUTION INTRAMUSCULAR; INTRAVENOUS; SUBCUTANEOUS at 12:14

## 2022-10-26 RX ADMIN — SODIUM CHLORIDE, PRESERVATIVE FREE 10 ML: 5 INJECTION INTRAVENOUS at 08:35

## 2022-10-26 ASSESSMENT — PAIN - FUNCTIONAL ASSESSMENT
PAIN_FUNCTIONAL_ASSESSMENT: PREVENTS OR INTERFERES SOME ACTIVE ACTIVITIES AND ADLS

## 2022-10-26 ASSESSMENT — PAIN DESCRIPTION - DESCRIPTORS
DESCRIPTORS: STABBING
DESCRIPTORS: SHARP;ACHING
DESCRIPTORS: STABBING

## 2022-10-26 ASSESSMENT — PAIN DESCRIPTION - PAIN TYPE: TYPE: ACUTE PAIN

## 2022-10-26 ASSESSMENT — PAIN DESCRIPTION - LOCATION
LOCATION: ABDOMEN

## 2022-10-26 ASSESSMENT — PAIN SCALES - GENERAL
PAINLEVEL_OUTOF10: 8
PAINLEVEL_OUTOF10: 9
PAINLEVEL_OUTOF10: 8

## 2022-10-26 ASSESSMENT — PAIN DESCRIPTION - ORIENTATION
ORIENTATION: RIGHT
ORIENTATION: MID
ORIENTATION: RIGHT

## 2022-10-26 ASSESSMENT — PAIN DESCRIPTION - ONSET: ONSET: ON-GOING

## 2022-10-26 ASSESSMENT — PAIN DESCRIPTION - FREQUENCY: FREQUENCY: CONTINUOUS

## 2022-10-26 NOTE — PROGRESS NOTES
V2.0  [facility] [place] Progress Note      Name:  Lexi Mcneill /Age/Sex: 2000  (22 y.o. female)   MRN & CSN:  [id number] & [id number] Encounter Date/Time: 10/26/2022 2:01 PM EDT    Location:  3385/3209-X PCP: Ailyn Fagan MD       Hospital Day: 2      Subjective:     Chief Complaint: Abdominal Pain (Brought in via EMS for abd pain starting this morning; blood glucose was 386 prior to arrival; denies hx of DM)       Patient feeling better compared to yesterday. No nausea or vomiting today. Was asking if her diet can be advanced. Denies lightheadedness, dizziness, fever, night sweats, chills, chest pain, cough, dyspnea, palpitations, diarrhea, dysuria. Assessment and Plan:   Lexi Mcneill is a 22 y.o. female with no significant PMH  who presents with Acute pancreatitis without infection or necrosis, unspecified pancreatitis type      Plan:  Pancreatitis unknown etiology - improving  No alcohol use, U/S negative for gallbladder etiology. Triglycerides not too high 360-390's. Could be 2/2 drugs as there are case reports linking Dominique Bknneka with pancreatitis. - advance diet as tolerated  - continue IVF  - pain, emesis management  - will consult GI; appreciate recs    New onset of DM II. HA1C 9.7 10/26/2022. Last glucose in 2022 was in the pre-diabetes range. Could be sequale of pancreatitis. Came borderline DKA (ketones in urine, glucose 300's, beta hyrdoxybuterate 18, anion gap 16) however, anion gap, and glucose improved with aggressive IVF and insulin drip.  - Increase Lantus 15U nightly  - change SSI to HDSSI    Hypertriglyceridemia. Triglycerides 350's. - will need to start pt on statin once pancreatitis improved  - lifestyle changes    Tobacco dependence: Tobacco user. Nicotine patch and lozenges ordered. Patient was counseled on tobacco cessation. Diet ADULT DIET; Clear Liquid; 4 carb choices (60 gm/meal);  Low Fat (less than or equal to 50 gm/day)   DVT Prophylaxis [x] Lovenox, []  Heparin, [] SCDs, [] Ambulation,  [] Eliquis, [] Xarelto  [] Coumadin   Code Status Full Code   Disposition From: home  Expected Disposition: home  Estimated Date of Discharge: 2-3 days  Patient requires continued admission due to pancreatitis management   Surrogate Decision Maker/ POA      Review of Systems:    Review of Systems    See above    Objective:   No intake or output data in the 24 hours ending 10/26/22 1454     Vitals:   Vitals:    10/26/22 1244   BP:    Pulse:    Resp: 16   Temp:    SpO2:        Physical Exam:     General: NAD  Eyes: EOMI  ENT: neck supple  Cardiovascular: Regular rate. Respiratory: Clear to auscultation  Gastrointestinal: diffuse tenderness, Soft  Genitourinary: no suprapubic tenderness  Musculoskeletal: No edema  Skin: warm, dry  Neuro: Alert. Psych: Mood appropriate.      Medications:   Medications:    insulin lispro  0-16 Units SubCUTAneous Q4H    sodium chloride flush  5-40 mL IntraVENous 2 times per day    enoxaparin  40 mg SubCUTAneous BID    nicotine  1 patch TransDERmal Daily    insulin lispro  0-8 Units SubCUTAneous Q4H    insulin glargine  10 Units SubCUTAneous Nightly      Infusions:    sodium chloride 150 mL/hr at 10/26/22 1214    dextrose      sodium chloride       PRN Meds: sodium phosphate IVPB, 15 mmol, PRN  albuterol sulfate HFA, 2 puff, Q6H PRN  glucose, 4 tablet, PRN  dextrose bolus, 125 mL, PRN   Or  dextrose bolus, 250 mL, PRN  glucagon (rDNA), 1 mg, PRN  dextrose, , Continuous PRN  sodium chloride flush, 5-40 mL, PRN  sodium chloride, , PRN  polyethylene glycol, 17 g, Daily PRN  HYDROmorphone, 0.25 mg, Q3H PRN   Or  HYDROmorphone, 0.5 mg, Q3H PRN  promethazine, 12.5 mg, Q6H PRN   Or  ondansetron, 4 mg, Q6H PRN  metoclopramide, 10 mg, Q6H PRN  nicotine polacrilex, 2 mg, Q1H PRN      Labs      Recent Results (from the past 24 hour(s))   POCT Glucose - every hour    Collection Time: 10/25/22  3:21 PM   Result Value Ref Range    Glucose 259 mg/dL    QC OK? ok    POCT Glucose    Collection Time: 10/25/22  3:21 PM   Result Value Ref Range    POC Glucose 259 (H) 70 - 99 MG/DL   POCT Glucose    Collection Time: 10/25/22  4:34 PM   Result Value Ref Range    POC Glucose 216 (H) 70 - 99 MG/DL   POCT Glucose - every hour    Collection Time: 10/25/22  4:36 PM   Result Value Ref Range    Glucose 216 mg/dL    QC OK?  yes    SPECIMEN REJECTION    Collection Time: 10/25/22  5:43 PM   Result Value Ref Range    Rejected Test EBCG     Reason for Rejection UNABLE TO PERFORM TESTING:    POCT Glucose    Collection Time: 10/25/22  6:12 PM   Result Value Ref Range    POC Glucose 248 (H) 70 - 99 MG/DL   POCT Glucose - every hour    Collection Time: 10/25/22  6:13 PM   Result Value Ref Range    Glucose 248 mg/dL    QC OK? ok    Basic Metabolic Panel    Collection Time: 10/25/22  6:31 PM   Result Value Ref Range    Sodium 136 135 - 145 MMOL/L    Potassium 4.1 3.5 - 5.1 MMOL/L    Chloride 103 99 - 110 mMol/L    CO2 22 21 - 32 MMOL/L    Anion Gap 11 4 - 16    BUN 8 6 - 23 MG/DL    Creatinine 0.5 (L) 0.6 - 1.1 MG/DL    Est, Glom Filt Rate >60 >60 mL/min/1.73m2    Glucose 213 (H) 70 - 99 MG/DL    Calcium 9.1 8.3 - 10.6 MG/DL   Magnesium    Collection Time: 10/25/22  6:31 PM   Result Value Ref Range    Magnesium 1.7 (L) 1.8 - 2.4 mg/dl   Phosphorus    Collection Time: 10/25/22  6:31 PM   Result Value Ref Range    Phosphorus 3.5 2.5 - 4.9 MG/DL   Basic Metabolic Panel    Collection Time: 10/25/22  6:31 PM   Result Value Ref Range    Sodium 138 135 - 145 MMOL/L    Potassium 4.0 3.5 - 5.1 MMOL/L    Chloride 103 99 - 110 mMol/L    CO2 22 21 - 32 MMOL/L    Anion Gap 13 4 - 16    BUN 8 6 - 23 MG/DL    Creatinine 0.5 (L) 0.6 - 1.1 MG/DL    Est, Glom Filt Rate >60 >60 mL/min/1.73m2    Glucose 221 (H) 70 - 99 MG/DL    Calcium 8.8 8.3 - 10.6 MG/DL   Magnesium    Collection Time: 10/25/22  6:31 PM   Result Value Ref Range    Magnesium 2.0 1.8 - 2.4 mg/dl   Phosphorus    Collection Time: 10/25/22  6:31 PM   Result Value Ref Range    Phosphorus 3.4 2.5 - 4.9 MG/DL   Triglyceride    Collection Time: 10/25/22  6:31 PM   Result Value Ref Range    Triglycerides 353 (H) <150 MG/DL   POCT Glucose    Collection Time: 10/25/22  7:45 PM   Result Value Ref Range    POC Glucose 208 (H) 70 - 99 MG/DL   POCT Glucose    Collection Time: 10/25/22  9:32 PM   Result Value Ref Range    POC Glucose 181 (H) 70 - 99 MG/DL   POCT Glucose    Collection Time: 10/26/22 12:53 AM   Result Value Ref Range    POC Glucose 222 (H) 70 - 99 MG/DL   Hemoglobin A1C    Collection Time: 10/26/22  2:30 AM   Result Value Ref Range    Hemoglobin A1C 9.7 (H) 4.2 - 6.3 %    eAG 232 mg/dL   Basic Metabolic Panel    Collection Time: 10/26/22  2:37 AM   Result Value Ref Range    Sodium 139 135 - 145 MMOL/L    Potassium 4.0 3.5 - 5.1 MMOL/L    Chloride 104 99 - 110 mMol/L    CO2 25 21 - 32 MMOL/L    Anion Gap 10 4 - 16    BUN 8 6 - 23 MG/DL    Creatinine 0.6 0.6 - 1.1 MG/DL    Est, Glom Filt Rate >60 >60 mL/min/1.73m2    Glucose 223 (H) 70 - 99 MG/DL    Calcium 8.9 8.3 - 10.6 MG/DL   Comprehensive Metabolic Panel w/ Reflex to MG    Collection Time: 10/26/22  2:37 AM   Result Value Ref Range    Sodium 140 135 - 145 MMOL/L    Potassium 3.8 3.5 - 5.1 MMOL/L    Chloride 104 99 - 110 mMol/L    CO2 25 21 - 32 MMOL/L    BUN 8 6 - 23 MG/DL    Creatinine 0.6 0.6 - 1.1 MG/DL    Est, Glom Filt Rate >60 >60 mL/min/1.73m2    Glucose 222 (H) 70 - 99 MG/DL    Calcium 9.0 8.3 - 10.6 MG/DL    Albumin 3.9 3.4 - 5.0 GM/DL    Total Protein 6.1 (L) 6.4 - 8.2 GM/DL    Total Bilirubin 0.8 0.0 - 1.0 MG/DL    ALT 49 (H) 10 - 40 U/L    AST 27 15 - 37 IU/L    Alkaline Phosphatase 65 40 - 128 IU/L    Anion Gap 11 4 - 16   Hepatic function panel    Collection Time: 10/26/22  2:37 AM   Result Value Ref Range    Albumin 3.9 3.4 - 5.0 GM/DL    Total Bilirubin 0.8 0.0 - 1.0 MG/DL    Bilirubin, Direct 0.2 0.0 - 0.3 MG/DL    Bilirubin, Indirect 0.6 0 - 0.7 MG/DL    Alkaline Phosphatase 65 40 - 129 IU/L    AST 27 15 - 37 IU/L    ALT 49 (H) 10 - 40 U/L    Total Protein 6.1 (L) 6.4 - 8.2 GM/DL   Lactic Acid    Collection Time: 10/26/22  2:37 AM   Result Value Ref Range    Lactate 1.3 0.4 - 2.0 mMOL/L   CBC with Auto Differential    Collection Time: 10/26/22  2:37 AM   Result Value Ref Range    WBC 8.0 4.0 - 10.5 K/CU MM    RBC 4.99 4.2 - 5.4 M/CU MM    Hemoglobin 14.4 12.5 - 16.0 GM/DL    Hematocrit 44.5 37 - 47 %    MCV 89.2 78 - 100 FL    MCH 28.9 27 - 31 PG    MCHC 32.4 32.0 - 36.0 %    RDW 13.2 11.7 - 14.9 %    Platelets 338 822 - 381 K/CU MM    MPV 13.2 (H) 7.5 - 11.1 FL    Differential Type AUTOMATED DIFFERENTIAL     Segs Relative 62.4 36 - 66 %    Lymphocytes % 29.7 24 - 44 %    Monocytes % 6.1 (H) 0 - 4 %    Eosinophils % 1.4 0 - 3 %    Basophils % 0.2 0 - 1 %    Segs Absolute 5.0 K/CU MM    Lymphocytes Absolute 2.4 K/CU MM    Monocytes Absolute 0.5 K/CU MM    Eosinophils Absolute 0.1 K/CU MM    Basophils Absolute 0.0 K/CU MM    Nucleated RBC % 0.0 %    Total Nucleated RBC 0.0 K/CU MM    Total Immature Neutrophil 0.02 K/CU MM    Immature Neutrophil % 0.2 0 - 0.43 %   Lipase    Collection Time: 10/26/22  2:37 AM   Result Value Ref Range    Lipase 226 (H) 13 - 60 IU/L   Triglyceride    Collection Time: 10/26/22  2:37 AM   Result Value Ref Range    Triglycerides 391 (H) <150 MG/DL   POCT Glucose    Collection Time: 10/26/22  5:47 AM   Result Value Ref Range    POC Glucose 219 (H) 70 - 99 MG/DL   POCT Glucose    Collection Time: 10/26/22 10:13 AM   Result Value Ref Range    POC Glucose 278 (H) 70 - 99 MG/DL   POCT Glucose    Collection Time: 10/26/22  1:54 PM   Result Value Ref Range    POC Glucose 228 (H) 70 - 99 MG/DL        Imaging/Diagnostics Last 24 Hours   XR CHEST (2 VW)    Result Date: 10/25/2022  EXAMINATION: TWO XRAY VIEWS OF THE CHEST 10/25/2022 11:45 am COMPARISON: 09/11/2022 HISTORY: ORDERING SYSTEM PROVIDED HISTORY: RUQ Pain TECHNOLOGIST PROVIDED HISTORY: Reason for exam:->RUQ Pain Reason for Exam: RUQ pain FINDINGS: The cardiomediastinal silhouette is unchanged. The lungs are underinflated, resulting in vascular crowding and subsegmental atelectasis. No discrete focal consolidation, pneumothorax, or pleural effusion. Osseous structures are grossly unchanged. Low lung volumes without acute cardiopulmonary findings. CT ABDOMEN PELVIS W IV CONTRAST Additional Contrast? None    Result Date: 10/25/2022  EXAMINATION: CT OF THE ABDOMEN AND PELVIS WITH CONTRAST 10/25/2022 12:48 pm TECHNIQUE: CT of the abdomen and pelvis was performed with the administration of intravenous contrast. Multiplanar reformatted images are provided for review. Automated exposure control, iterative reconstruction, and/or weight based adjustment of the mA/kV was utilized to reduce the radiation dose to as low as reasonably achievable. COMPARISON: None. HISTORY: ORDERING SYSTEM PROVIDED HISTORY: Sudden onset epigastric Pain TECHNOLOGIST PROVIDED HISTORY: Additional Contrast?->None Reason for exam:->Sudden onset epigastric Pain Decision Support Exception - unselect if not a suspected or confirmed emergency medical condition->Emergency Medical Condition (MA) Reason for Exam: Sudden onset epigastric Pain Additional signs and symptoms: 80ml Isovue 370 FINDINGS: Lower Chest: Clear Organs: There is severe fatty infiltration of the liver. The spleen, adrenal glands, kidneys and gallbladder grossly unremarkable. There is soft tissue stranding in the region of head of the pancreas. GI/Bowel: There is a nonobstructed bowel-gas pattern Pelvis: The bladder is unremarkable in appearance there is no evidence for free air or free fluid in the pelvis. Peritoneum/Retroperitoneum: There is no evidence for aneurysm Bones/Soft Tissues: No acute abnormality     Soft tissue stranding in the region of the head of the pancreas suspicious for acute pancreatitis. Recommend correlation with amylase and lipase.      [address] [facility] ABDOMEN LIMITED Specify organ? PANCREAS    Result Date: 10/26/2022  EXAMINATION: RIGHT UPPER QUADRANT ULTRASOUND 10/26/2022 8:19 am COMPARISON: CT abdomen and pelvis 10/25/2022 and abdominal ultrasound 06/24/2016. HISTORY: ORDERING SYSTEM PROVIDED HISTORY:  Pancreatitis TECHNOLOGIST PROVIDED HISTORY: Reason for Exam:  Pancreatitis Specify Organ? Pancreas FINDINGS: LIVER:  There is diffuse abnormal increased echogenicity throughout the liver consistent with hepatic steatosis. No focal liver lesions are identified. The liver is enlarged measuring 23 cm in length. BILIARY SYSTEM:  Gallbladder is unremarkable without evidence of pericholecystic fluid, wall thickening or stones. Negative sonographic Crespo's sign. Common bile duct is within normal limits measuring 5 mm. RIGHT KIDNEY: The right kidney is grossly unremarkable without evidence of hydronephrosis. PANCREAS:  The pancreas is not well visualized due to overlying bowel gas. There is no abnormal fluid collection identified. OTHER: No evidence of right upper quadrant ascites. Hepatic steatosis, otherwise unremarkable right upper quadrant ultrasound.        Electronically signed by Lexi Mckenzie MD on 10/26/2022 at 2:54 PM

## 2022-10-26 NOTE — ED NOTES
ED TO INPATIENT SBAR HANDOFF    Patient Name: Aziza Lewis   :  2000  22 y.o. MRN:  [id number]  Preferred Name  [facility]  ED Room #:  ED20/ED-20  Family/Caregiver Present yes   Restraints no   Sitter no   Sepsis Risk Score Sepsis Risk Score: 1.33    Situation  Code Status: No Order No additional code details. Allergies: Midol [acetaminophen], Pollen extract, and Cefazolin  Weight: Patient Vitals for the past 96 hrs (Last 3 readings):   Weight   10/25/22 1052 (!) 350 lb (158.8 kg)     Arrived from: home  Chief Complaint:   Chief Complaint   Patient presents with    Abdominal Pain     Brought in via EMS for abd pain starting this morning; blood glucose was 386 prior to arrival; denies hx of DM     Hospital Problem/Diagnosis:  Principal Problem:    Acute pancreatitis without infection or necrosis, unspecified pancreatitis type  Resolved Problems:    * No resolved hospital problems. *    Imaging:   CT ABDOMEN PELVIS W IV CONTRAST Additional Contrast? None   Final Result   Soft tissue stranding in the region of the head of the pancreas suspicious   for acute pancreatitis. Recommend correlation with amylase and lipase. XR CHEST (2 VW)   Preliminary Result   Low lung volumes without acute cardiopulmonary findings.            Abnormal labs:   Abnormal Labs Reviewed   CBC WITH AUTO DIFFERENTIAL - Abnormal; Notable for the following components:       Result Value    MPV 13.7 (*)     Lymphocytes % 52.6 (*)     Monocytes % 6.6 (*)     Eosinophils % 3.1 (*)     All other components within normal limits   LIPASE - Abnormal; Notable for the following components:    Lipase 1,299 (*)     All other components within normal limits   COMPREHENSIVE METABOLIC PANEL - Abnormal; Notable for the following components:    Glucose 347 (*)     ALT 61 (*)     All other components within normal limits   URINALYSIS - Abnormal; Notable for the following components:    Glucose, Urine >1,000 (*)     Ketones, Urine 40 (*) Protein, UA TRACE (*)     All other components within normal limits   BLOOD GAS, VENOUS - Abnormal; Notable for the following components:    pH, Jorge 7.31 (*)     pO2, Jorge 81 (*)     Base Excess 4 (*)     O2 Sat, Jorge 91.2 (*)     All other components within normal limits   BETA-HYDROXYBUTYRATE - Abnormal; Notable for the following components:    Beta-Hydroxybutyrate 18.3 (*)     All other components within normal limits   MICROSCOPIC URINALYSIS - Abnormal; Notable for the following components:    Bacteria, UA RARE (*)     Mucus, UA RARE (*)     All other components within normal limits   BASIC METABOLIC PANEL - Abnormal; Notable for the following components:    Creatinine 0.5 (*)     Glucose 221 (*)     All other components within normal limits   POCT GLUCOSE - Abnormal; Notable for the following components:    POC Glucose 335 (*)     All other components within normal limits   POCT GLUCOSE - Abnormal; Notable for the following components:    POC Glucose 351 (*)     All other components within normal limits   POCT GLUCOSE - Abnormal; Notable for the following components:    POC Glucose 321 (*)     All other components within normal limits   POCT GLUCOSE - Abnormal; Notable for the following components:    POC Glucose 259 (*)     All other components within normal limits   POCT GLUCOSE - Abnormal; Notable for the following components:    POC Glucose 216 (*)     All other components within normal limits   POCT GLUCOSE - Abnormal; Notable for the following components:    POC Glucose 248 (*)     All other components within normal limits   POCT GLUCOSE - Abnormal; Notable for the following components:    POC Glucose 208 (*)     All other components within normal limits     Critical values: no     Abnormal Assessment Findings: Abd pain, insulin elevated 208 BGL    Background  History:   Past Medical History:   Diagnosis Date    ADHD (attention deficit hyperactivity disorder)     ADHD (attention deficit hyperactivity disorder)     Reflux        Assessment    Vitals/MEWS: MEWS Score: 0  Level of Consciousness: Alert (0)   Vitals:    10/25/22 1700 10/25/22 1730 10/25/22 1800 10/25/22 1945   BP: (!) 148/85 (!) 148/79 139/82 128/69   Pulse: 95 80 78 99   Resp: 21 18 18 27   Temp:       TempSrc:       SpO2: 93% 93% 96% 96%   Weight:       Height:         FiO2 (%): NA   O2 Flow Rate: O2 Device: None (Room air)    Cardiac Rhythm:    Pain Assessment:  [x] Verbal [] Schaeffer Hun Scale  Pain Scale: Pain Assessment  Pain Assessment: 0-10  Pain Level: 9  Patient's Stated Pain Goal: 10  Pain Location: Abdomen  Pain Orientation: Right, Upper  Pain Descriptors: Stabbing  Pain Type: Acute pain  Pain Frequency: Continuous  Last documented pain score (0-10 scale) Pain Level: 9  Last documented pain medication administered: 1629 - Dilaudid  Mental Status: oriented, alert, coherent, and logical  NIH Score:    C-SSRS: Risk of Suicide: No Risk  Bedside swallow:    Urszula Coma Scale (GCS): Active LDA's:   Peripheral IV 10/25/22 Right Antecubital (Active)       Peripheral IV 10/25/22 Right Hand (Active)     PO Status: Diabetic - new onset  Pertinent or High Risk Medications/Drips: no   If Yes, please provide details: Insulin drip has been discontinued  Pending Blood Product Administration: no     You may also review the ED PT Care Timeline found under the Summary Nursing Index tab. Recommendation    Pending orders NA  Plan for Discharge (if known):    Additional Comments: NA   If any further questions, please call Sending RN at [facility]    Electronically signed by: Electronically signed by Chris Box RN on 10/25/2022 at [address] [facility], RN  10/25/22 2014

## 2022-10-26 NOTE — CONSULTS
IV Consult complete. Nexiva 20g 1.75\" extra long PIV inserted in left FA x1 attempt with ultrasound guidance.

## 2022-10-26 NOTE — CARE COORDINATION
SW Student reviewed pt chart. Pt has PCP and insurance. Pt lives at home with parents. Pt is independent of her ADLs. No needs identified at this time. CM available if needed.

## 2022-10-26 NOTE — H&P
V2.0  History and Physical      Name:  Lauren Varela /Age/Sex: 2000  (22 y.o. female)   MRN & CSN:  [id number] & [id number] Encounter Date/Time: 10/25/2022 8:31 PM EDT   Location:  ED[facility]/ED-20 PCP: Roshan Mayer MD       Hospital Day: 1    Assessment and Plan:   Lauren Varela is a 22 y.o. female with a pmh as noted below presents with abdominal pain, hyperglycemia    Epigastric abdominal pain   Acute pancreatitis in the setting of hyperglycemia  Hepatic stenosis with hepatomegaly   Admit to inpatient, immediate   CT abdomen pelvis with IV contrast completed in ED shows soft tissue stranding in the region of the head of the pancreas suspicious for acute pancreatitis. -Lipase 1299,   ALT 61 AST 33. Bilirubin normal  -Check UA and urine culture  - Zofran for nausea  UA positive for ketones, glucose, negative for nitrates and leukocytes. NPO  Continue IVF   -Initiate IVF    None anion gap metabolic acidosis  Hyperglycemia  Morbid Obesity    -Anion gap 16 on arrival, down to 13, likely secondary to resolving hyperglycemia, acidosis. pH 7.1. Received aggressive fluid resuscitation in ED. Continue n.p.o. status. Advance electrolyte checks to every 8 hours. Repeat VBG in AM.   Accu-Cheks every 4 hours. Transitioned off insulin drip in ED. 4 units lispro insulin subcu x1 for blood sugar 240. Initiate medium n.p.o. sliding scale every 4 hours every 4 hours Accu-Cheks  Start long-acting glargine 20 units units at night   Management of underlying pancreatitis as noted above   Hemoglobin A1c pending, likely type 2 diabetes mellitus, new diagnosis   Beta Hydroxybutyrate 18.3    Chronic Conditions: continue home medication as ordered  Tobacco dependence: Tobacco user. Nicotine patch and lozenges ordered. Patient was counseled on tobacco cessation. All testing  and results reviewed with patient . All questions answered.  Patient and family voiced understanding    This patient was seen and examined in conjunction with Dr. Rimma Hurd. He/She was agreeable with the plan and management as dictated above. Disposition:   Expected Disposition: Home  Estimated D/C: 3 days     Diet Diet NPO   GI Prophylaxis  [] PPI,  [x] H2 Blocker,  [] Carafate,  [] Diet/Tube Feeds   DVT Prophylaxis [x] Lovenox, []  Heparin, [] SCDs, [] Ambulation,  [] NOAC   Code Status No Order   Surrogate Decision Maker/ POA Patients mother          History from:     patient, electronic medical record,     History of Present Illness:     Chief Complaint: Acute pancreatitis without infection or necrosis, unspecified pancreatitis type  Hugo Martinez is a 22 y.o. female with pmh of morbid obesity, hyperinsulinemia who reports the emergency department with acute abdominal pain, near epigastric pain with acute onset this morning. Patient describes pain as waxing and waning. States pain started in right flank and radiated to the right upper quadrant as well over to the left side. Also endorses nausea vomiting and watery diarrhea. Denies recent fevers. Denies chest pain or difficulty breathing. Endorses increased frequency urinating. Denies dysuria or hematuria describes the pain as diffuse and cramping across her upper abdomen. EMS reported the patient's blood sugar was over 300. The patient denies history of diabetes. Patient's mother is at bedside. Patient's has been told she was borderline diabetic since she was 16. Review of Systems: Need 10 Elements   Review of Systems   Constitutional:  Positive for activity change, appetite change, chills and diaphoresis. Negative for fatigue. HENT:  Negative for congestion and postnasal drip. Eyes:  Negative for redness and visual disturbance. Respiratory:  Negative for cough and shortness of breath. Cardiovascular:  Negative for chest pain and palpitations. Gastrointestinal:  Positive for abdominal pain, diarrhea, nausea and vomiting.    Endocrine: Positive for polydipsia and polyuria. Negative for cold intolerance and heat intolerance. Genitourinary:  Positive for frequency. Negative for difficulty urinating and dysuria. Musculoskeletal:  Negative for joint swelling and neck pain. Skin: Negative. Neurological:  Negative for dizziness and speech difficulty. Psychiatric/Behavioral:  Negative for agitation and confusion. Objective:   No intake or output data in the 24 hours ending 10/25/22 2118   Vitals:   Vitals:    10/25/22 1700 10/25/22 1730 10/25/22 1800 10/25/22 1945   BP: (!) 148/85 (!) 148/79 139/82 128/69   Pulse: 95 80 78 99   Resp: 21 18 18 27   Temp:       TempSrc:       SpO2: 93% 93% 96% 96%   Weight:       Height:           Medications Prior to Admission     Prior to Admission medications    Medication Sig Start Date End Date Taking? Authorizing Provider   albuterol sulfate  (90 Base) MCG/ACT inhaler Inhale 1-2 puffs into the lungs every 6 hours as needed for Wheezing 9/11/21   Patricia Wilson PA-C   ketorolac (TORADOL) 10 MG tablet Take 1 tablet by mouth every 6 hours as needed for Pain 1/13/21 9/11/21  Grace Lewis [address] [place], [place] [address]CHANDLER   naproxen (NAPROSYN) 500 MG tablet Take 1 tablet by mouth 2 times daily 11/10/20 9/11/21  Lis Mack DO       Physical Exam: Need 8 Elements   Physical Exam  Vitals and nursing note reviewed. Constitutional:       General: She is not in acute distress. Appearance: Normal appearance. HENT:      Head: Normocephalic. Nose: Nose normal.      Mouth/Throat:      Pharynx: Oropharynx is clear. Eyes:      Pupils: Pupils are equal, round, and reactive to light. Cardiovascular:      Rate and Rhythm: Regular rhythm. Tachycardia present. Pulses: Normal pulses. Pulmonary:      Effort: Pulmonary effort is normal. No respiratory distress. Breath sounds: No wheezing or rhonchi. Abdominal:      General: Abdomen is flat. Bowel sounds are normal. There is no distension.       Tenderness: There is abdominal tenderness. There is no right CVA tenderness, left CVA tenderness or guarding. Musculoskeletal:         General: Normal range of motion. Cervical back: Normal range of motion. Skin:     General: Skin is warm and dry. Capillary Refill: Capillary refill takes less than 2 seconds. Neurological:      General: No focal deficit present. Mental Status: She is alert and oriented to person, place, and time. Psychiatric:         Mood and Affect: Mood normal.          Past Medical History:   PMHx   Past Medical History:   Diagnosis Date    ADHD (attention deficit hyperactivity disorder)     ADHD (attention deficit hyperactivity disorder)     Reflux      PSHX:  has no past surgical history on file. Allergies:    Allergies   Allergen Reactions    Midol [Acetaminophen] Shortness Of Breath and Rash    Pollen Extract Anaphylaxis     sneezing    Cefazolin      Fam HX: Family history of diabetes on high cholesterol and father  Soc HX:   Social History     Socioeconomic History    Marital status: Single     Spouse name: None    Number of children: None    Years of education: None    Highest education level: None   Tobacco Use    Smoking status: Every Day     Packs/day: 0.50     Types: Cigarettes    Smokeless tobacco: Current     Types: Chew   Substance and Sexual Activity    Alcohol use: Yes     Comment: occasional    Drug use: Yes     Types: Marijuana [place])    Sexual activity: Yes     Partners: Female       Medications:   Medications:    insulin lispro  4 Units SubCUTAneous Once      Infusions:    sodium chloride      dextrose 5 % and 0.45 % NaCl 150 mL/hr at 10/25/22 1650     PRN Meds: dextrose bolus, 125 mL, PRN   Or  dextrose bolus, 250 mL, PRN  potassium chloride, 10 mEq, PRN  magnesium sulfate, 1,000 mg, PRN  sodium phosphate IVPB, 10 mmol, PRN   Or  sodium phosphate IVPB, 15 mmol, PRN   Or  sodium phosphate IVPB, 20 mmol, PRN  dextrose 5 % and 0.45 % NaCl, , Continuous PRN      Labs CBC:   Recent Labs     10/25/22  1105   WBC 8.4   HGB 15.0        BMP:    Recent Labs     10/25/22  1105 10/25/22  1521 10/25/22  1636 10/25/22  1813 10/25/22  1831     --   --   --  138   K 3.9  --   --   --  4.0     --   --   --  103   CO2 21  --   --   --  22   BUN 10  --   --   --  8   CREATININE 0.6  --   --   --  0.5*   GLUCOSE 347*   < > 216 248 221*    < > = values in this interval not displayed. Hepatic:   Recent Labs     10/25/22  1105   AST 33   ALT 61*   BILITOT 0.6   ALKPHOS 76     Lipids: No results found for: CHOL, HDL, TRIG  Hemoglobin A1C: No results found for: LABA1C  TSH: No results found for: TSH  Troponin:   Lab Results   Component Value Date/Time    TROPONINT <0.010 11/10/2020 03:16 PM     Lactic Acid: No results for input(s): LACTA in the last 72 hours. BNP: No results for input(s): PROBNP in the last 72 hours.   UA:  Lab Results   Component Value Date/Time    NITRU NEGATIVE 10/25/2022 11:58 AM    COLORU YELLOW 10/25/2022 11:58 AM    WBCUA 1 10/25/2022 11:58 AM    RBCUA <1 10/25/2022 11:58 AM    MUCUS RARE 10/25/2022 11:58 AM    TRICHOMONAS NONE SEEN 10/25/2022 11:58 AM    BACTERIA RARE 10/25/2022 11:58 AM    CLARITYU CLEAR 10/25/2022 11:58 AM    SPECGRAV 1.025 10/25/2022 11:58 AM    LEUKOCYTESUR NEGATIVE 10/25/2022 11:58 AM    UROBILINOGEN 0.2 10/25/2022 11:58 AM    BILIRUBINUR NEGATIVE 10/25/2022 11:58 AM    BLOODU NEGATIVE 10/25/2022 11:58 AM    KETUA 40 10/25/2022 11:58 AM     Urine Cultures: No results found for: Zoila Arguello  Blood Cultures: No results found for: BC  No results found for: BLOODCULT2  Organism: No results found for: ORG    Imaging/Diagnostics Last 24 Hours   XR CHEST (2 VW)    Result Date: 10/25/2022  EXAMINATION: TWO XRAY VIEWS OF THE CHEST 10/25/2022 11:45 am COMPARISON: 09/11/2022 HISTORY: ORDERING SYSTEM PROVIDED HISTORY: RUQ Pain TECHNOLOGIST PROVIDED HISTORY: Reason for exam:->RUQ Pain Reason for Exam: RUQ pain FINDINGS: The cardiomediastinal silhouette is unchanged. The lungs are underinflated, resulting in vascular crowding and subsegmental atelectasis. No discrete focal consolidation, pneumothorax, or pleural effusion. Osseous structures are grossly unchanged. Low lung volumes without acute cardiopulmonary findings. CT ABDOMEN PELVIS W IV CONTRAST Additional Contrast? None    Result Date: 10/25/2022  EXAMINATION: CT OF THE ABDOMEN AND PELVIS WITH CONTRAST 10/25/2022 12:48 pm TECHNIQUE: CT of the abdomen and pelvis was performed with the administration of intravenous contrast. Multiplanar reformatted images are provided for review. Automated exposure control, iterative reconstruction, and/or weight based adjustment of the mA/kV was utilized to reduce the radiation dose to as low as reasonably achievable. COMPARISON: None. HISTORY: ORDERING SYSTEM PROVIDED HISTORY: Sudden onset epigastric Pain TECHNOLOGIST PROVIDED HISTORY: Additional Contrast?->None Reason for exam:->Sudden onset epigastric Pain Decision Support Exception - unselect if not a suspected or confirmed emergency medical condition->Emergency Medical Condition (MA) Reason for Exam: Sudden onset epigastric Pain Additional signs and symptoms: 80ml Isovue 370 FINDINGS: Lower Chest: Clear Organs: There is severe fatty infiltration of the liver. The spleen, adrenal glands, kidneys and gallbladder grossly unremarkable. There is soft tissue stranding in the region of head of the pancreas. GI/Bowel: There is a nonobstructed bowel-gas pattern Pelvis: The bladder is unremarkable in appearance there is no evidence for free air or free fluid in the pelvis. Peritoneum/Retroperitoneum: There is no evidence for aneurysm Bones/Soft Tissues: No acute abnormality     Soft tissue stranding in the region of the head of the pancreas suspicious for acute pancreatitis. Recommend correlation with amylase and lipase.              Electronically signed by IJEOMA Downing CNP on 10/25/2022 at 9:18 PM          This dictation was created with voice recognition software. While attempts have been made to review the dictation as it is transcribed, on occasion the spoken word can be misinterpreted by the technology leading to omissions or inappropriate words, phrases or sentences.      Electronically signed by IJEOMA Carrasco CNP on 10/25/2022 at 9:18 PM

## 2022-10-26 NOTE — PLAN OF CARE
Spoke with RN. Patient will be NPO after 2300 hours.     Okay to do abdominal ultrasound after 0700 hours 10/26/22

## 2022-10-27 LAB
ALBUMIN SERPL-MCNC: 3.5 GM/DL (ref 3.4–5)
ANION GAP SERPL CALCULATED.3IONS-SCNC: 11 MMOL/L (ref 4–16)
BASOPHILS ABSOLUTE: 0 K/CU MM
BASOPHILS RELATIVE PERCENT: 0.3 % (ref 0–1)
BUN BLDV-MCNC: 8 MG/DL (ref 6–23)
CALCIUM SERPL-MCNC: 8.4 MG/DL (ref 8.3–10.6)
CHLORIDE BLD-SCNC: 105 MMOL/L (ref 99–110)
CO2: 22 MMOL/L (ref 21–32)
CREAT SERPL-MCNC: 0.5 MG/DL (ref 0.6–1.1)
DIFFERENTIAL TYPE: ABNORMAL
EOSINOPHILS ABSOLUTE: 0.2 K/CU MM
EOSINOPHILS RELATIVE PERCENT: 2.2 % (ref 0–3)
GFR SERPL CREATININE-BSD FRML MDRD: >60 ML/MIN/1.73M2
GLUCOSE BLD-MCNC: 160 MG/DL (ref 70–99)
GLUCOSE BLD-MCNC: 180 MG/DL (ref 70–99)
GLUCOSE BLD-MCNC: 195 MG/DL (ref 70–99)
GLUCOSE BLD-MCNC: 220 MG/DL (ref 70–99)
GLUCOSE BLD-MCNC: 250 MG/DL (ref 70–99)
GLUCOSE BLD-MCNC: 261 MG/DL (ref 70–99)
HCT VFR BLD CALC: 39.7 % (ref 37–47)
HEMOGLOBIN: 12.6 GM/DL (ref 12.5–16)
IMMATURE NEUTROPHIL %: 0.3 % (ref 0–0.43)
LYMPHOCYTES ABSOLUTE: 2.5 K/CU MM
LYMPHOCYTES RELATIVE PERCENT: 34.8 % (ref 24–44)
MAGNESIUM: 1.6 MG/DL (ref 1.8–2.4)
MCH RBC QN AUTO: 28.6 PG (ref 27–31)
MCHC RBC AUTO-ENTMCNC: 31.7 % (ref 32–36)
MCV RBC AUTO: 90.2 FL (ref 78–100)
MONOCYTES ABSOLUTE: 0.6 K/CU MM
MONOCYTES RELATIVE PERCENT: 7.8 % (ref 0–4)
NUCLEATED RBC %: 0 %
PDW BLD-RTO: 13.1 % (ref 11.7–14.9)
PHOSPHORUS: 3.1 MG/DL (ref 2.5–4.9)
PLATELET # BLD: 147 K/CU MM (ref 140–440)
PMV BLD AUTO: 13 FL (ref 7.5–11.1)
POTASSIUM SERPL-SCNC: 3.7 MMOL/L (ref 3.5–5.1)
RBC # BLD: 4.4 M/CU MM (ref 4.2–5.4)
SEGMENTED NEUTROPHILS ABSOLUTE COUNT: 4 K/CU MM
SEGMENTED NEUTROPHILS RELATIVE PERCENT: 54.6 % (ref 36–66)
SODIUM BLD-SCNC: 138 MMOL/L (ref 135–145)
TOTAL IMMATURE NEUTOROPHIL: 0.02 K/CU MM
TOTAL NUCLEATED RBC: 0 K/CU MM
WBC # BLD: 7.3 K/CU MM (ref 4–10.5)

## 2022-10-27 PROCEDURE — 80069 RENAL FUNCTION PANEL: CPT

## 2022-10-27 PROCEDURE — 36415 COLL VENOUS BLD VENIPUNCTURE: CPT

## 2022-10-27 PROCEDURE — 6360000002 HC RX W HCPCS: Performed by: STUDENT IN AN ORGANIZED HEALTH CARE EDUCATION/TRAINING PROGRAM

## 2022-10-27 PROCEDURE — 82787 IGG 1 2 3 OR 4 EACH: CPT

## 2022-10-27 PROCEDURE — 6370000000 HC RX 637 (ALT 250 FOR IP): Performed by: NURSE PRACTITIONER

## 2022-10-27 PROCEDURE — 6360000002 HC RX W HCPCS: Performed by: NURSE PRACTITIONER

## 2022-10-27 PROCEDURE — 2060000000 HC ICU INTERMEDIATE R&B

## 2022-10-27 PROCEDURE — 6370000000 HC RX 637 (ALT 250 FOR IP): Performed by: STUDENT IN AN ORGANIZED HEALTH CARE EDUCATION/TRAINING PROGRAM

## 2022-10-27 PROCEDURE — 2580000003 HC RX 258: Performed by: NURSE PRACTITIONER

## 2022-10-27 PROCEDURE — 94761 N-INVAS EAR/PLS OXIMETRY MLT: CPT

## 2022-10-27 PROCEDURE — 82962 GLUCOSE BLOOD TEST: CPT

## 2022-10-27 PROCEDURE — 83735 ASSAY OF MAGNESIUM: CPT

## 2022-10-27 PROCEDURE — 85025 COMPLETE CBC W/AUTO DIFF WBC: CPT

## 2022-10-27 RX ORDER — METOCLOPRAMIDE 10 MG/1
10 TABLET ORAL EVERY 6 HOURS PRN
Status: DISCONTINUED | OUTPATIENT
Start: 2022-10-27 | End: 2022-10-28 | Stop reason: HOSPADM

## 2022-10-27 RX ORDER — OXYCODONE HYDROCHLORIDE 5 MG/1
5 TABLET ORAL EVERY 6 HOURS PRN
Status: DISCONTINUED | OUTPATIENT
Start: 2022-10-27 | End: 2022-10-28 | Stop reason: HOSPADM

## 2022-10-27 RX ORDER — INSULIN LISPRO 100 [IU]/ML
3 INJECTION, SOLUTION INTRAVENOUS; SUBCUTANEOUS
Status: DISCONTINUED | OUTPATIENT
Start: 2022-10-27 | End: 2022-10-28

## 2022-10-27 RX ORDER — MAGNESIUM SULFATE IN WATER 40 MG/ML
2000 INJECTION, SOLUTION INTRAVENOUS ONCE
Status: COMPLETED | OUTPATIENT
Start: 2022-10-27 | End: 2022-10-27

## 2022-10-27 RX ADMIN — ENOXAPARIN SODIUM 40 MG: 40 INJECTION SUBCUTANEOUS at 21:19

## 2022-10-27 RX ADMIN — INSULIN GLARGINE 15 UNITS: 100 INJECTION, SOLUTION SUBCUTANEOUS at 21:20

## 2022-10-27 RX ADMIN — HYDROMORPHONE HYDROCHLORIDE 0.5 MG: 1 INJECTION, SOLUTION INTRAMUSCULAR; INTRAVENOUS; SUBCUTANEOUS at 08:58

## 2022-10-27 RX ADMIN — INSULIN LISPRO 3 UNITS: 100 INJECTION, SOLUTION INTRAVENOUS; SUBCUTANEOUS at 18:44

## 2022-10-27 RX ADMIN — OXYCODONE HYDROCHLORIDE 5 MG: 5 TABLET ORAL at 13:18

## 2022-10-27 RX ADMIN — OXYCODONE HYDROCHLORIDE 5 MG: 5 TABLET ORAL at 21:26

## 2022-10-27 RX ADMIN — SODIUM CHLORIDE, PRESERVATIVE FREE 10 ML: 5 INJECTION INTRAVENOUS at 08:59

## 2022-10-27 RX ADMIN — SODIUM CHLORIDE, PRESERVATIVE FREE 10 ML: 5 INJECTION INTRAVENOUS at 21:20

## 2022-10-27 RX ADMIN — ENOXAPARIN SODIUM 40 MG: 40 INJECTION SUBCUTANEOUS at 08:56

## 2022-10-27 RX ADMIN — INSULIN LISPRO 8 UNITS: 100 INJECTION, SOLUTION INTRAVENOUS; SUBCUTANEOUS at 12:10

## 2022-10-27 RX ADMIN — INSULIN LISPRO 4 UNITS: 100 INJECTION, SOLUTION INTRAVENOUS; SUBCUTANEOUS at 18:38

## 2022-10-27 RX ADMIN — MAGNESIUM SULFATE HEPTAHYDRATE 2000 MG: 2 INJECTION, SOLUTION INTRAVENOUS at 12:09

## 2022-10-27 ASSESSMENT — PAIN DESCRIPTION - DESCRIPTORS
DESCRIPTORS: ACHING
DESCRIPTORS: STABBING

## 2022-10-27 ASSESSMENT — PAIN SCALES - GENERAL
PAINLEVEL_OUTOF10: 9
PAINLEVEL_OUTOF10: 10
PAINLEVEL_OUTOF10: 6
PAINLEVEL_OUTOF10: 0
PAINLEVEL_OUTOF10: 1
PAINLEVEL_OUTOF10: 6

## 2022-10-27 ASSESSMENT — PAIN DESCRIPTION - LOCATION
LOCATION: ABDOMEN

## 2022-10-27 ASSESSMENT — PAIN DESCRIPTION - ORIENTATION
ORIENTATION: MID
ORIENTATION: MID;LEFT;RIGHT

## 2022-10-27 NOTE — PROGRESS NOTES
V2.0  [facility]      Name:  Shelby Benton /Age/Sex: 2000  (22 y.o. female)   MRN & CSN:  [id number] & [id number] Encounter Date/Time: 10/27/2022 2:01 PM EDT    Location:  [address] [place] [address] PCP: David Hardwick MD       Hospital Day: 3      Subjective:     Chief Complaint: Abdominal Pain (Brought in via EMS for abd pain starting this morning; blood glucose was 386 prior to arrival; denies hx of DM)     Patient feeling better compared to yesterday. No nausea or vomiting today. Was asking if her diet can be advanced. Pt's glucose have been under control. Denies lightheadedness, dizziness, fever, night sweats, chills, chest pain, cough, dyspnea, palpitations, diarrhea, dysuria. Assessment and Plan:   Shelby Benton is a 22 y.o. female with no significant PMH  who presents with Acute pancreatitis without infection or necrosis, unspecified pancreatitis type      Plan:  Pancreatitis unknown etiology - improving  No alcohol use, U/S negative for gallbladder etiology. Triglycerides not too high 360-390's. Could be 2/2 drugs as there are case reports linking Claudio Fox with pancreatitis. - advance diet as tolerated  - continue IVF; decrease rate. - pain, emesis management  - GI consulted; appreciate recs    New onset of DM II. HA1C 9.7 10/26/2022. Last glucose in 2022 was in the pre-diabetes range. Could be sequale of pancreatitis. Came borderline DKA (ketones in urine, glucose 300's, beta hyrdoxybuterate 18, anion gap 16) however, anion gap, and glucose improved with aggressive IVF and insulin drip. - continue  Lantus 15U nightly  - continue HDSSI  - add 3U lispro TID    Hypertriglyceridemia. Triglycerides 350's. - will need to start pt on statin once pancreatitis improved  - lifestyle changes    Tobacco dependence: Tobacco user. Nicotine patch and lozenges ordered. Patient was counseled on tobacco cessation. Diet ADULT DIET;  Full Liquid; 4 carb choices (60 gm/meal); Low Fat/Low Chol/High Fiber/MITA   DVT Prophylaxis [x] Lovenox, []  Heparin, [] SCDs, [] Ambulation,  [] Eliquis, [] Xarelto  [] Coumadin   Code Status Full Code   Disposition From: home  Expected Disposition: home  Estimated Date of Discharge: 10/27 vs 10/28  Patient requires continued admission due to pancreatitis management; awaiting GI recs   Surrogate Decision Maker/ POA      Review of Systems:    Review of Systems    See above    Objective:   No intake or output data in the 24 hours ending 10/27/22 1328     Vitals:   Vitals:    10/27/22 0925   BP: (!) 116/48   Pulse: 88   Resp: 18   Temp: 99.7 °F (37.6 °C)   SpO2:        Physical Exam:     General: NAD  Eyes: EOMI  ENT: neck supple  Cardiovascular: Regular rate. Respiratory: Clear to auscultation  Gastrointestinal: diffuse tenderness, Soft  Genitourinary: no suprapubic tenderness  Musculoskeletal: No edema  Skin: warm, dry  Neuro: Alert. Psych: Mood appropriate.      Medications:   Medications:    magnesium sulfate  2,000 mg IntraVENous Once    insulin lispro  3 Units SubCUTAneous TID WC    insulin glargine  15 Units SubCUTAneous Nightly    insulin lispro  0-16 Units SubCUTAneous TID WC    insulin lispro  0-4 Units SubCUTAneous Nightly    sodium chloride flush  5-40 mL IntraVENous 2 times per day    enoxaparin  40 mg SubCUTAneous BID    nicotine  1 patch TransDERmal Daily      Infusions:    sodium chloride 100 mL/hr at 10/27/22 1202    dextrose      sodium chloride       PRN Meds: oxyCODONE, 5 mg, Q6H PRN  metoclopramide, 10 mg, Q6H PRN  sodium phosphate IVPB, 15 mmol, PRN  albuterol sulfate HFA, 2 puff, Q6H PRN  glucose, 4 tablet, PRN  dextrose bolus, 125 mL, PRN   Or  dextrose bolus, 250 mL, PRN  glucagon (rDNA), 1 mg, PRN  dextrose, , Continuous PRN  sodium chloride flush, 5-40 mL, PRN  sodium chloride, , PRN  polyethylene glycol, 17 g, Daily PRN  promethazine, 12.5 mg, Q6H PRN   Or  ondansetron, 4 mg, Q6H PRN  nicotine polacrilex, 2 mg, Q1H PRN      Labs      Recent Results (from the past 24 hour(s))   POCT Glucose    Collection Time: 10/26/22  1:54 PM   Result Value Ref Range    POC Glucose 228 (H) 70 - 99 MG/DL   POCT Glucose    Collection Time: 10/26/22  5:51 PM   Result Value Ref Range    POC Glucose 202 (H) 70 - 99 MG/DL   POCT Glucose    Collection Time: 10/26/22  8:33 PM   Result Value Ref Range    POC Glucose 166 (H) 70 - 99 MG/DL   POCT Glucose    Collection Time: 10/27/22  3:07 AM   Result Value Ref Range    POC Glucose 160 (H) 70 - 99 MG/DL   Renal Function Panel    Collection Time: 10/27/22  5:28 AM   Result Value Ref Range    Sodium 138 135 - 145 MMOL/L    Potassium 3.7 3.5 - 5.1 MMOL/L    Chloride 105 99 - 110 mMol/L    CO2 22 21 - 32 MMOL/L    Anion Gap 11 4 - 16    BUN 8 6 - 23 MG/DL    Creatinine 0.5 (L) 0.6 - 1.1 MG/DL    Est, Glom Filt Rate >60 >60 mL/min/1.73m2    Glucose 180 (H) 70 - 99 MG/DL    Calcium 8.4 8.3 - 10.6 MG/DL    Albumin 3.5 3.4 - 5.0 GM/DL    Phosphorus 3.1 2.5 - 4.9 MG/DL   Magnesium    Collection Time: 10/27/22  5:28 AM   Result Value Ref Range    Magnesium 1.6 (L) 1.8 - 2.4 mg/dl   CBC with Auto Differential    Collection Time: 10/27/22  5:28 AM   Result Value Ref Range    WBC 7.3 4.0 - 10.5 K/CU MM    RBC 4.40 4.2 - 5.4 M/CU MM    Hemoglobin 12.6 12.5 - 16.0 GM/DL    Hematocrit 39.7 37 - 47 %    MCV 90.2 78 - 100 FL    MCH 28.6 27 - 31 PG    MCHC 31.7 (L) 32.0 - 36.0 %    RDW 13.1 11.7 - 14.9 %    Platelets 602 876 - 051 K/CU MM    MPV 13.0 (H) 7.5 - 11.1 FL    Differential Type AUTOMATED DIFFERENTIAL     Segs Relative 54.6 36 - 66 %    Lymphocytes % 34.8 24 - 44 %    Monocytes % 7.8 (H) 0 - 4 %    Eosinophils % 2.2 0 - 3 %    Basophils % 0.3 0 - 1 %    Segs Absolute 4.0 K/CU MM    Lymphocytes Absolute 2.5 K/CU MM    Monocytes Absolute 0.6 K/CU MM    Eosinophils Absolute 0.2 K/CU MM    Basophils Absolute 0.0 K/CU MM    Nucleated RBC % 0.0 %    Total Nucleated RBC 0.0 K/CU MM    Total Immature Neutrophil 0.02 K/CU MM    Immature Neutrophil % 0.3 0 - 0.43 %   POCT Glucose    Collection Time: 10/27/22  8:25 AM   Result Value Ref Range    POC Glucose 195 (H) 70 - 99 MG/DL   POCT Glucose    Collection Time: 10/27/22 12:00 PM   Result Value Ref Range    POC Glucose 250 (H) 70 - 99 MG/DL        Imaging/Diagnostics Last 24 Hours   XR CHEST (2 VW)    Result Date: 10/25/2022  EXAMINATION: TWO XRAY VIEWS OF THE CHEST 10/25/2022 11:45 am COMPARISON: 09/11/2022 HISTORY: ORDERING SYSTEM PROVIDED HISTORY: RUQ Pain TECHNOLOGIST PROVIDED HISTORY: Reason for exam:->RUQ Pain Reason for Exam: RUQ pain FINDINGS: The cardiomediastinal silhouette is unchanged. The lungs are underinflated, resulting in vascular crowding and subsegmental atelectasis. No discrete focal consolidation, pneumothorax, or pleural effusion. Osseous structures are grossly unchanged. Low lung volumes without acute cardiopulmonary findings. CT ABDOMEN PELVIS W IV CONTRAST Additional Contrast? None    Result Date: 10/25/2022  EXAMINATION: CT OF THE ABDOMEN AND PELVIS WITH CONTRAST 10/25/2022 12:48 pm TECHNIQUE: CT of the abdomen and pelvis was performed with the administration of intravenous contrast. Multiplanar reformatted images are provided for review. Automated exposure control, iterative reconstruction, and/or weight based adjustment of the mA/kV was utilized to reduce the radiation dose to as low as reasonably achievable. COMPARISON: None. HISTORY: ORDERING SYSTEM PROVIDED HISTORY: Sudden onset epigastric Pain TECHNOLOGIST PROVIDED HISTORY: Additional Contrast?->None Reason for exam:->Sudden onset epigastric Pain Decision Support Exception - unselect if not a suspected or confirmed emergency medical condition->Emergency Medical Condition (MA) Reason for Exam: Sudden onset epigastric Pain Additional signs and symptoms: 80ml Isovue 370 FINDINGS: Lower Chest: Clear Organs: There is severe fatty infiltration of the liver.   The spleen, adrenal glands, kidneys and gallbladder grossly unremarkable. There is soft tissue stranding in the region of head of the pancreas. GI/Bowel: There is a nonobstructed bowel-gas pattern Pelvis: The bladder is unremarkable in appearance there is no evidence for free air or free fluid in the pelvis. Peritoneum/Retroperitoneum: There is no evidence for aneurysm Bones/Soft Tissues: No acute abnormality     Soft tissue stranding in the region of the head of the pancreas suspicious for acute pancreatitis. Recommend correlation with amylase and lipase. US ABDOMEN LIMITED Specify organ? PANCREAS    Result Date: 10/26/2022  EXAMINATION: RIGHT UPPER QUADRANT ULTRASOUND 10/26/2022 8:19 am COMPARISON: CT abdomen and pelvis 10/25/2022 and abdominal ultrasound 06/24/2016. HISTORY: ORDERING SYSTEM PROVIDED HISTORY:  Pancreatitis TECHNOLOGIST PROVIDED HISTORY: Reason for Exam:  Pancreatitis Specify Organ? Pancreas FINDINGS: LIVER:  There is diffuse abnormal increased echogenicity throughout the liver consistent with hepatic steatosis. No focal liver lesions are identified. The liver is enlarged measuring 23 cm in length. BILIARY SYSTEM:  Gallbladder is unremarkable without evidence of pericholecystic fluid, wall thickening or stones. Negative sonographic Crespo's sign. Common bile duct is within normal limits measuring 5 mm. RIGHT KIDNEY: The right kidney is grossly unremarkable without evidence of hydronephrosis. PANCREAS:  The pancreas is not well visualized due to overlying bowel gas. There is no abnormal fluid collection identified. OTHER: No evidence of right upper quadrant ascites. Hepatic steatosis, otherwise unremarkable right upper quadrant ultrasound.        Electronically signed by Carlos Moon MD on 10/27/2022 at 1:28 PM

## 2022-10-27 NOTE — CONSULTS
[facility]  Gastroenterology Consultation    10/27/2022  12:04 PM    Patient:    Analia Canseco  : 2000   22 y.o. MRN: [id number]  Admitted: 10/25/2022 10:47 AM ATT: Brook Castellon MD   [id number]  AdmitDx: Diabetic ketoacidosis without coma associated with diabetes mellitus due to underlying condition ([facility].) [E08.10]  Acute pancreatitis, unspecified complication status, unspecified pancreatitis type [K85.90]  Acute pancreatitis without infection or necrosis, unspecified pancreatitis type [K85.90]  PCP: Rose Cordoba MD    Reason for Consult: New pancreatitis, new onset diabetes    Requesting Physician:  Brook Castellon MD      History Obtained From:  Patient and review of all records    HISTORY OF PRESENT ILLNESS:                The patient is a 22 y.o. female with significant   Past Medical History:   Diagnosis Date    ADHD (attention deficit hyperactivity disorder)     ADHD (attention deficit hyperactivity disorder)     Reflux     who presented to [facility] ED with sudden onset abdominal pain, nausea,  nonbilious non-bloody vomiting. Pain described as epigastric, sharp intermittently sharp and stabbing, constantly dull, starting about 2 days ago. Patient has never experienced this before. newly diagnosed with DM2, denies drinking alcohol. CT with acute pancreatitis, US no gallstones, lipase 1299 on admission, lipase now 226. GI consult for acute pancreatitis. Patient reports improvement in abdominal pain with pain medication, and reports nausea vomiting has resolved since admission. Last BM 2 days ago    No history of EGD  No history of colonoscopy     No ASA   No NSAIDs  No anti-platelet therapy    Current smoker  Rare alcohol intake, every few months    Past Medical History:        Diagnosis Date    ADHD (attention deficit hyperactivity disorder)     ADHD (attention deficit hyperactivity disorder)     Reflux        Past Surgical History:    History reviewed.  No pertinent surgical history. Current Medications:    Medications    Scheduled Medications:    magnesium sulfate  2,000 mg IntraVENous Once    insulin glargine  15 Units SubCUTAneous Nightly    insulin lispro  0-16 Units SubCUTAneous TID WC    insulin lispro  0-4 Units SubCUTAneous Nightly    sodium chloride flush  5-40 mL IntraVENous 2 times per day    enoxaparin  40 mg SubCUTAneous BID    nicotine  1 patch TransDERmal Daily     PRN Medications: oxyCODONE, metoclopramide, [DISCONTINUED] sodium phosphate IVPB **OR** sodium phosphate IVPB **OR** [DISCONTINUED] sodium phosphate IVPB, albuterol sulfate HFA, glucose, dextrose bolus **OR** dextrose bolus, glucagon (rDNA), dextrose, sodium chloride flush, sodium chloride, polyethylene glycol, promethazine **OR** ondansetron, nicotine polacrilex      Allergies:  Midol [acetaminophen], Pollen extract, and Cefazolin    Social History:   TOBACCO:   reports that she has been smoking cigarettes. She has been smoking an average of .5 packs per day. Her smokeless tobacco use includes chew. ETOH:   reports current alcohol use. Family History:   History reviewed. No pertinent family history. No family history of colon cancer, Crohn's disease, or ulcerative colitis. REVIEW OF SYSTEMS:    The positive ROS will be identified in bold, otherwise ROS are negative     CONSTITUTIONAL:  Neg   Recent weight changes, fatigue, fever, chills or night sweats  EYES:  Neg  Blurriness, earing, itching or acute change in vision  EARS:  Neg  hearing loss, tinnitus, vertigo, discharge or earache. NOSE:  Neg  Rhinorrhea, sneezing, itching, allergy or epistaxis  MOUTH/THROAT:  Neg  bleeding gums, hoarseness or sore throat.   RESPIRATORY:   Neg SOB, wheeze, cough, sputum, hemoptysis or bronchitis  CARDIOVASCULAR:  Neg chest pain, palpitations, dyspnea on exertion, orthopnea, paroxysmal nocturnal dyspnea or edema  GASTROINTESTINAL:  SEE HPI  GENITOURINARY:  Neg  Urinary frequency, hesitancy, urgency, polyuria, dysuria, hematuria, nocturia, or incontinence. HEMATOLOGIC/LYMPHATIC:  Neg  Anemia, bleeding tendency  MUSCULOSKELETAL:    New myalgias, bone pain, joint pain, swelling or stiffness and has had change in gait. NEUROLOGICAL:  Neg  Loss of Consciousness, memory loss, forgetfulness, periods of confusion, difficulty concentrating, seizures, decline in intellect, nervousness, insomina, aphasia or dysarthria. SKIN:  Neg  skin or hair changes, and has no itching, rashes, or sores. PSYCHIATRIC:  Neg depression, personality changes, anxiety. ENDOCRINE:  Neg polydipsia, polyuria, abnormal weight changes, heat /cold intolerance.   ALL/IMM:  Neg reactions to drugs other than listed    PHYSICAL EXAM:      Vitals:    BP (!) 116/48   Pulse 88   Temp 99.7 °F (37.6 °C) (Oral)   Resp 18   Ht 5' 4\" (1.626 m)   Wt (!) 309 lb 12.8 oz (140.5 kg)   SpO2 96%   BMI 53.18 kg/m²     General Appearance:    Alert, cooperative, no distress, appears stated age   HEENT:    Normocephalic, atraumatic, Conjunctiva clear, Lips, mucosa, and tongue normal; teeth and gums normal   Neck:   Supple, symmetrical, trachea midline   Lungs:     Clear to auscultation bilaterally, respirations unlabored   Chest Wall:    No tenderness or deformity    Heart:    Regular rate and rhythm, S1 and S2 normal, no murmur, rub   or gallop   Abdomen:     Soft, epigastric tenderness bowel sounds active all four quadrants, no masses, no organomegaly, no ascites    Rectal:    Deferred   Extremities:   Extremities normal, atraumatic, no cyanosis or edema   Pulses:   2+ and symmetric all extremities   Skin:   Skin color, texture, turgor normal, no rashes or lesions   Lymph nodes:   No abnormality   Neurologic:   No focal deficits, moving all four extremities        DATA:    ABGs: No results found for: PHART, PO2ART, TUX4VGO  CBC:   Recent Labs     10/25/22  1105 10/26/22  0237 10/27/22  0528   WBC 8.4 8.0 7.3   HGB 15.0 14.4 12.6    192 147     BMP: Recent Labs     10/25/22  1831 10/26/22  0237 10/27/22  0528     138 140  139 138   K 4.1  4.0 3.8  4.0 3.7     103 104  104 105   CO2 22 22 25 25 22   BUN 8  8 8  8 8   CREATININE 0.5*  0.5* 0.6  0.6 0.5*   GLUCOSE 213*  221* 222*  223* 180*     Magnesium:   Lab Results   Component Value Date/Time    MG 1.6 10/27/2022 05:28 AM     Hepatic:   Recent Labs     10/25/22  1105 10/26/22  0237   AST 33 27  27   ALT 61* 49*  49*   BILITOT 0.6 0.8  0.8   ALKPHOS 76 65  65     Recent Labs     10/25/22  1105 10/26/22  0237   LIPASE 1,299* 226*     No results for input(s): PROTIME, INR in the last 72 hours. No results for input(s): PTT in the last 72 hours. Lipids: No results for input(s): CHOL, HDL in the last 72 hours. Invalid input(s): LDLCALCU  INR: No results for input(s): INR in the last 72 hours. TSH: No results found for: TSH  No intake or output data in the 24 hours ending 10/27/22 1204   sodium chloride 100 mL/hr at 10/27/22 1202    dextrose      sodium chloride         Imaging Studies: Reviewed    Ultrasound: Hepatic steatosis, otherwise unremarkable right upper quadrant ultrasound. CT-scan of abdomen and pelvis  Soft tissue stranding in the region of the head of the pancreas suspicious   for acute pancreatitis. Recommend correlation with amylase and lipase.          IMPRESSION:      Patient Active Problem List   Diagnosis Code    Acute pancreatitis without infection or necrosis, unspecified pancreatitis type K85.90       ASSESSMENT:  Acute pancreatitis  First episode  CT revealed pancreatitis, US no gallstones  Etiology unclear  N/VD resolved, abdominal pain improved with pain medication, lipase 226 (was 1,299 on admission)  Clinically and biochemically improving    Recent DM2 diagnosis, smoker, obesity, triglycerides 391    RECOMMENDATIONS:  Rule out autoimmune etiology, IgG4  Pain, nausea medication as needed  IVF  Advance diet as tolerated    Discussed plan of care with patient and RN    Patient clinical, biochemical, and radiological information discussed with Dr. Sudarshan Crow. He agrees with the assessment and plan. IJEOMA Frankel CNP, CNP  10/27/2022  12:04 PM     Acute pancreatitis etiology not clear most probably secondary to smoking and diabetes  May be viral  Continue IV fluids  Advance diet as tolerated  Advise low-fat diet  Advised to stop smoking  Agree with checking for autoimmune etiology    I have seen and examined the patient myself. I have reviewed the hospital care given to date and reviewed all pertinent labs and imaging. The plan was developed mutually at the time of visit with the patient, Sherren Meline CNP and myself. I have spoken with the patient, nursing staff and provided written and verbal instructions.      The above note has been reviewed and I agree with the assessment, diagnosis, and treatment plan with as suggested by Sherren Meline CNP with changes made by me as above      [facility] gastroenterology

## 2022-10-28 VITALS
BODY MASS INDEX: 50.02 KG/M2 | RESPIRATION RATE: 15 BRPM | TEMPERATURE: 98.6 F | WEIGHT: 293 LBS | OXYGEN SATURATION: 98 % | SYSTOLIC BLOOD PRESSURE: 112 MMHG | HEIGHT: 64 IN | DIASTOLIC BLOOD PRESSURE: 70 MMHG | HEART RATE: 85 BPM

## 2022-10-28 LAB
GLUCOSE BLD-MCNC: 191 MG/DL (ref 70–99)
GLUCOSE BLD-MCNC: 199 MG/DL (ref 70–99)

## 2022-10-28 PROCEDURE — 94761 N-INVAS EAR/PLS OXIMETRY MLT: CPT

## 2022-10-28 PROCEDURE — 2580000003 HC RX 258: Performed by: STUDENT IN AN ORGANIZED HEALTH CARE EDUCATION/TRAINING PROGRAM

## 2022-10-28 PROCEDURE — 6370000000 HC RX 637 (ALT 250 FOR IP): Performed by: STUDENT IN AN ORGANIZED HEALTH CARE EDUCATION/TRAINING PROGRAM

## 2022-10-28 PROCEDURE — 6370000000 HC RX 637 (ALT 250 FOR IP): Performed by: NURSE PRACTITIONER

## 2022-10-28 PROCEDURE — 82962 GLUCOSE BLOOD TEST: CPT

## 2022-10-28 PROCEDURE — 6360000002 HC RX W HCPCS: Performed by: NURSE PRACTITIONER

## 2022-10-28 RX ORDER — INSULIN GLARGINE 100 [IU]/ML
18 INJECTION, SOLUTION SUBCUTANEOUS NIGHTLY
Qty: 5 ML | Refills: 2 | Status: SHIPPED | OUTPATIENT
Start: 2022-10-28 | End: 2022-11-27

## 2022-10-28 RX ORDER — BLOOD-GLUCOSE METER
1 KIT MISCELLANEOUS DAILY
Qty: 1 KIT | Refills: 0 | Status: SHIPPED | OUTPATIENT
Start: 2022-10-28

## 2022-10-28 RX ORDER — INSULIN LISPRO 100 [IU]/ML
5 INJECTION, SOLUTION INTRAVENOUS; SUBCUTANEOUS
Status: DISCONTINUED | OUTPATIENT
Start: 2022-10-28 | End: 2022-10-28 | Stop reason: HOSPADM

## 2022-10-28 RX ORDER — LANCETS 30 GAUGE
1 EACH MISCELLANEOUS 3 TIMES DAILY
Qty: 200 EACH | Refills: 0 | Status: SHIPPED | OUTPATIENT
Start: 2022-10-28

## 2022-10-28 RX ORDER — GLUCOSAMINE HCL/CHONDROITIN SU 500-400 MG
CAPSULE ORAL
Qty: 120 STRIP | Refills: 3 | Status: SHIPPED | OUTPATIENT
Start: 2022-10-28

## 2022-10-28 RX ORDER — INSULIN LISPRO 100 [IU]/ML
5 INJECTION, SOLUTION INTRAVENOUS; SUBCUTANEOUS
Qty: 22 ML | Refills: 2 | Status: SHIPPED | OUTPATIENT
Start: 2022-10-28 | End: 2022-11-27

## 2022-10-28 RX ORDER — OXYCODONE HYDROCHLORIDE 5 MG/1
5 TABLET ORAL EVERY 8 HOURS PRN
Qty: 9 TABLET | Refills: 0 | Status: SHIPPED | OUTPATIENT
Start: 2022-10-28 | End: 2022-10-31

## 2022-10-28 RX ORDER — ATORVASTATIN CALCIUM 20 MG/1
10 TABLET, FILM COATED ORAL DAILY
Qty: 30 TABLET | Refills: 3 | Status: SHIPPED | OUTPATIENT
Start: 2022-10-28

## 2022-10-28 RX ADMIN — ENOXAPARIN SODIUM 40 MG: 40 INJECTION SUBCUTANEOUS at 09:29

## 2022-10-28 RX ADMIN — SODIUM CHLORIDE: 9 INJECTION, SOLUTION INTRAVENOUS at 05:07

## 2022-10-28 RX ADMIN — INSULIN LISPRO 5 UNITS: 100 INJECTION, SOLUTION INTRAVENOUS; SUBCUTANEOUS at 13:10

## 2022-10-28 RX ADMIN — INSULIN LISPRO 5 UNITS: 100 INJECTION, SOLUTION INTRAVENOUS; SUBCUTANEOUS at 09:29

## 2022-10-28 ASSESSMENT — PAIN SCALES - GENERAL
PAINLEVEL_OUTOF10: 3
PAINLEVEL_OUTOF10: 3

## 2022-10-28 ASSESSMENT — PAIN DESCRIPTION - DESCRIPTORS: DESCRIPTORS: ACHING

## 2022-10-28 ASSESSMENT — PAIN DESCRIPTION - FREQUENCY: FREQUENCY: INTERMITTENT

## 2022-10-28 ASSESSMENT — PAIN DESCRIPTION - LOCATION: LOCATION: ABDOMEN

## 2022-10-28 ASSESSMENT — PAIN DESCRIPTION - ORIENTATION: ORIENTATION: MID

## 2022-10-28 ASSESSMENT — PAIN DESCRIPTION - ONSET: ONSET: ON-GOING

## 2022-10-28 ASSESSMENT — PAIN - FUNCTIONAL ASSESSMENT: PAIN_FUNCTIONAL_ASSESSMENT: ACTIVITIES ARE NOT PREVENTED

## 2022-10-28 ASSESSMENT — PAIN DESCRIPTION - PAIN TYPE: TYPE: ACUTE PAIN

## 2022-10-28 NOTE — DISCHARGE INSTRUCTIONS
- please follow up with GI doctor and endocrinology specialist  - please follow up with your PCP  - please go to lab in one week for blood work  - please avoid food with fat for at least 3 weeks  - please work on decreasing fat and carbs in your diet  - please check your glucose few times a day; if you are sustaining numbers above 120, please contact your PCP ASAP  - take insulin sliding scale with meals as below    Take insulin according to glucose check 2 hours after meals:   0  140-199 3  200-249 6  250-299 9  300-349 12  350-400 15  >400 18    Take insulin according to glucose check before sleep:   0  140-199 2  200-249 3  250-299 5  300-349 6  350-400 7  >400 9

## 2022-10-28 NOTE — PROGRESS NOTES
Outpatient Pharmacy Progress Note for Meds-to-Beds    Total number of Prescriptions Filled: 7  The following medications were dispensed to the patient during the discharge process:  Atorvastatin 10mg  Lantus solostar   One Touch meter  One Touch strips  One Touch lancets  Alcohol swabs  Pen needles    Additional Documentation:  Medication(s) were delivered to the patient's room prior to discharge      Thank you for letting us serve your patients.   [facility]    [address], [address] [facility]    Phone: [phone]    Fax: [phone]

## 2022-10-28 NOTE — PROGRESS NOTES
Physician Progress Note      PATIENT:               Juni Gagnon  CSN #:                  [id number]  :                       2000  ADMIT DATE:       10/25/2022 10:47 AM  DISCH DATE:        10/28/2022 4:12 PM  RESPONDING  PROVIDER #:        Tristan Keating MD          QUERY TEXT:    /Patient admitted with pancreatitis and has borderline DKA documented by   Internal Med and DKA without c-nestor documented by GI. If possible, please   document in progress notes and discharge summary if you are evaluating and /or   treating any of the following: The medical record reflects the following:  Risk Factors: new onset DM  Clinical Indicators: borderline DKA per IM documentation, DKA without coma per   GI, ketones in urine, glucose 300's, beta hyrdoxybuterate 18, anion gap 16  Treatment: labs, imaging, Endocrinology and GI consult, IVF, Insulin drip    Thank you,  Arielle Fontaine RN CDS  Options provided:  -- DKA confirmed  -- DKA ruled out  -- Other - I will add my own diagnosis  -- Disagree - Not applicable / Not valid  -- Disagree - Clinically unable to determine / Unknown  -- Refer to Clinical Documentation Reviewer    PROVIDER RESPONSE TEXT:    Doesn't meet criteria for DKA.     Query created by: Alfredito Ruiz on 10/28/2022 4:25 PM      Electronically signed by:  Tristan Keating MD 10/28/2022 5:03 PM

## 2022-10-28 NOTE — PROGRESS NOTES
[facility] - [facility]       Progress Note       10/28/2022  8:42 AM    Patient:    Hugo Martinez  : 2000   22 y.o. MRN: [id number]  Admitted: 10/25/2022 10:47 AM ATT: Carlos Moon MD   [id number]  AdmitDx: Diabetic ketoacidosis without coma associated with diabetes mellitus due to underlying condition ([facility] [E08.10]  Acute pancreatitis, unspecified complication status, unspecified pancreatitis type [K85.90]  Acute pancreatitis without infection or necrosis, unspecified pancreatitis type [K85.90]  PCP: Ady Curtis MD    ASSESSMENT AND PLAN :  Abdominal pain improved  Abdomen soft  Advance to low-fat diet  Can be discharged from GI standpoint  Advised to follow-up as outpatient  Advised to keep good glycemic control    Patient Active Problem List   Diagnosis Code    Acute pancreatitis without infection or necrosis, unspecified pancreatitis type K85.90       Dr Liv Barraza MD  10/28/2022  8:42 AM      SUBJECTIVE:  Chart reviewed, events noted  Patient feeling well. No complaints. Having BM. Tolerating p.o. diet. No N/V. Less abdominal pain.     ROS:  Cardiovascular ROS: No chest pain  Gastrointestinal ROS: see above  Respiratory ROS: No SOB    OBJECTIVE:      /62   Pulse 83   Temp 99.2 °F (37.3 °C) (Oral)   Resp 16   Ht 5' 4\" (1.626 m)   Wt (!) 321 lb 14.4 oz (146 kg)   SpO2 97%   BMI 55.25 kg/m²     NAD  RRR, Nl s1s2  Lungs CTA Bilaterally, normal effort  Abdomen soft, ND, NT, no HSM, Bowel sounds normal  AAOx3, No asterixis     CBC:   Recent Labs     10/25/22  1105 10/26/22  0237 10/27/22  0528   WBC 8.4 8.0 7.3   HGB 15.0 14.4 12.6    192 147     BMP:    Recent Labs     10/25/22  1831 10/26/22  0237 10/27/22  0528     138 140  139 138   K 4.1  4.0 3.8  4.0 3.7     103 104  104 105   CO2    BUN 8  8 8  8 8   CREATININE 0.5*  0.5* 0.6  0.6 0.5*   GLUCOSE 213* 221* 222*  223* 180*     Magnesium:   Lab Results   Component Value Date/Time    MG 1.6 10/27/2022 05:28 AM     Hepatic:   Recent Labs     10/25/22  1105 10/26/22  0237   AST 33 27  27   ALT 61* 49*  49*   BILITOT 0.6 0.8  0.8   ALKPHOS 76 65  65     INR: No results for input(s): INR in the last 72 hours. Intake/Output Summary (Last 24 hours) at 10/28/2022 0842  Last data filed at 10/28/2022 3897  Gross per 24 hour   Intake 910 ml   Output --   Net 910 ml         Medications      Prior to Admission medications    Medication Sig Start Date End Date Taking? Authorizing Provider   albuterol sulfate  (90 Base) MCG/ACT inhaler Inhale 1-2 puffs into the lungs every 6 hours as needed for Wheezing 9/11/21   Rach Alonzo PA-C   ketorolac (TORADOL) 10 MG tablet Take 1 tablet by mouth every 6 hours as needed for Pain 1/13/21 9/11/21  [facility] Roa [facility]   naproxen (NAPROSYN) 500 MG tablet Take 1 tablet by mouth 2 times daily 11/10/20 9/11/21  Dean Thomas, DO      Scheduled Medications:    insulin lispro  5 Units SubCUTAneous TID     insulin glargine  15 Units SubCUTAneous Nightly    insulin lispro  0-16 Units SubCUTAneous TID     insulin lispro  0-4 Units SubCUTAneous Nightly    sodium chloride flush  5-40 mL IntraVENous 2 times per day    enoxaparin  40 mg SubCUTAneous BID    nicotine  1 patch TransDERmal Daily     Infusions:    sodium chloride 100 mL/hr at 10/28/22 0507    dextrose      sodium chloride       PRN Medications: oxyCODONE, metoclopramide, [DISCONTINUED] sodium phosphate IVPB **OR** sodium phosphate IVPB **OR** [DISCONTINUED] sodium phosphate IVPB, albuterol sulfate HFA, glucose, dextrose bolus **OR** dextrose bolus, glucagon (rDNA), dextrose, sodium chloride flush, sodium chloride, polyethylene glycol, promethazine **OR** ondansetron, nicotine polacrilex  Allergies:    Allergies   Allergen Reactions    Midol [Acetaminophen] Shortness Of Breath and Rash    Pollen Extract Anaphylaxis     sneezing    Cefazolin

## 2022-10-29 LAB
Lab: NORMAL
TEST NAME: NORMAL

## 2022-10-29 NOTE — DISCHARGE SUMMARY
Discharge Summary    Name:  John Spaulding /Age/Sex: 2000  (22 y.o. female)   MRN & CSN:  [id number] & [id number] Admission Date/Time: 10/25/2022 10:47 AM   Attending:  No att. providers found Discharging Physician: Rosmery Burks MD     Hospital Course:   John Spaulding is a 22 y.o.  female  who presents with Acute pancreatitis without infection or necrosis, unspecified pancreatitis type    HPI  Jeferson Franco is a 22 y.o. female with pmh of morbid obesity, hyperinsulinemia who reports the emergency department with acute abdominal pain, near epigastric pain with acute onset this morning. Patient describes pain as waxing and waning. States pain started in right flank and radiated to the right upper quadrant as well over to the left side. Also endorses nausea vomiting and watery diarrhea. Denies recent fevers. Denies chest pain or difficulty breathing. Endorses increased frequency urinating. Denies dysuria or hematuria describes the pain as diffuse and cramping across her upper abdomen. EMS reported the patient's blood sugar was over 300. The patient denies history of diabetes. Patient's mother is at bedside. Patient's has been told she was borderline diabetic since she was 14.\"       The following problems have been addressed during this hospitalization. Pancreatitis unknown etiology - improving  No alcohol use, U/S negative for gallbladder etiology. Triglycerides not too high 360-390's. Per GI, could be 2/2 smoking.   - pt tolerated advancing diet  - received aggressive IVF  - received pain, emesis management  - GI consulted; plan for outpatient follow up  - autoimmune pancreatitis IgG ordered; pending  - advised pt to avoid fatty food for the upcoming 2 weeks     New onset of DM II. HA1C 9.7 10/26/2022. Last glucose in 2022 was in the pre-diabetes range. Could be sequale of pancreatitis.    Came borderline DKA [ruled out though] (ketones in urine, glucose 300's, beta hyrdoxybuterate 18, anion gap 16) however, anion gap, and glucose improved with aggressive IVF and insulin drip. Pt received insulin subQ (lantus, HDSSI and lispro w meals)  - discharged on Lantus 15U nightly  - discharged on HDSSI  - discharged on 5U lispro TID     Hypertriglyceridemia. Triglycerides 350's. - pt started on atorvastatin 20 mg daily  - lifestyle changes    Patient was seen and examined at bedside on day of discharge. This note can be used as the progress note for today's visit. Pt tolerated advancing diet to regular with no issues. Her abd pain has improved. Denies n/v. Glucose numbers have improved. GI cleared pt for discharge. Denies lightheadedness, dizziness, fever, night sweats, chills, chest pain, cough, dyspnea, palpitations,  diarrhea, dysuria. Physical Exam  Vitals:   Vitals:    10/28/22 0904   BP: 112/70   Pulse: 85   Resp: 15   Temp: 98.6 °F (37 °C)   SpO2: 98%       General: NAD  Eyes: EOMI  ENT: neck supple  Cardiovascular: Regular rate. Respiratory: Clear to auscultation  Gastrointestinal: Soft, non tender  Genitourinary: no suprapubic tenderness  Musculoskeletal: No edema  Skin: warm, dry  Neuro: Alert. Psych: Mood appropriate. The patient expressed appropriate understanding of and agreement with the discharge recommendations, medications, and plan.      Consults this admission:  IP CONSULT TO DIABETES EDUCATOR  IP CONSULT TO CRITICAL CARE  IP CONSULT TO GI  IP CONSULT TO IV TEAM      Discharge Instruction:   Handoff to PCP:   - insulin adjustment    Follow up appointments: GI, PCP, endo    Primary care physician: Naun Meza MD      Diet:  diabetic diet and low fat, low cholesterol diet   Activity: activity as tolerated  Disposition: Discharged to:   [x]Home, [][facility], []SNF, []Acute Rehab, []Hospice   Condition on discharge: Stable    Discharge Medications:        Medication List        START taking these medications      atorvastatin 20 MG tablet  Commonly known as: Lipitor  Take 0.5 tablets by mouth daily     blood glucose test strips  Test 4 times a day & as needed for symptoms of irregular blood glucose. Dispense sufficient amount for indicated testing frequency plus additional to accommodate PRN testing needs. glucose monitoring kit  1 kit by Does not apply route daily     insulin glargine 100 UNIT/ML injection vial  Commonly known as: LANTUS  Inject 18 Units into the skin nightly     insulin lispro 100 UNIT/ML Soln injection vial  Commonly known as: HUMALOG  Inject 5 Units into the skin 3 times daily (with meals) Check glucose 2 hours after meals, and take insulin per sliding scale provided to you;. Lancets Misc  1 each by Does not apply route 3 times daily     oxyCODONE 5 MG immediate release tablet  Commonly known as: ROXICODONE  Take 1 tablet by mouth every 8 hours as needed for Pain for up to 3 days.             CONTINUE taking these medications      albuterol sulfate  (90 Base) MCG/ACT inhaler  Commonly known as: PROVENTIL;VENTOLIN;PROAIR  Inhale 1-2 puffs into the lungs every 6 hours as needed for Wheezing            STOP taking these medications      ketorolac 10 MG tablet  Commonly known as: TORADOL     naproxen 500 MG tablet  Commonly known as: Naprosyn               Where to Get Your Medications        These medications were sent to [address] [place] [address], [facility]       Phone: [phone]   atorvastatin 20 MG tablet  blood glucose test strips  glucose monitoring kit  insulin glargine 100 UNIT/ML injection vial  Lancets Misc       You can get these medications from any pharmacy    Bring a paper prescription for each of these medications  insulin lispro 100 UNIT/ML Soln injection vial  oxyCODONE 5 MG immediate release tablet         Objective Findings at Discharge:       BMP/CBC  Recent Labs     10/27/22  0528      K 3.7    CO2 22   BUN 8   CREATININE 0.5*   WBC 7.3   HCT 39.7          IMAGING:      Additional Information: Patient seen and examined day of discharge.  For more information regarding patient's care please contact [facility] records [phone]    Discharge Time of 35 minutes    Electronically signed by Carlos Moon MD on 10/29/2022 at 4:20 PM

## 2023-01-29 NOTE — LETTER
He denies additional palpitations an/do chest tightness, managed on Eliquis daily. He is not on a beta blocker du to bradycardia.   [facility] Emergency Department  [address] [address]  Phone: [phone]  Fax: [phone]             September 11, 2021    Patient: Kendra Welch   YOB: 2000   Date of Visit: 9/11/2021       To Whom It May Concern:    Patti Orozco was seen and treated in our emergency department on 9/11/2021. She may return to work on 9/12/2021. She was tested for COVID 19 and was negative.        Sincerely,             Signature:__________________________________

## 2023-03-23 ENCOUNTER — HOSPITAL ENCOUNTER (EMERGENCY)
Age: 23
Discharge: HOME | End: 2023-03-23
Attending: EMERGENCY MEDICINE
Payer: MEDICAID

## 2023-03-23 VITALS
SYSTOLIC BLOOD PRESSURE: 117 MMHG | DIASTOLIC BLOOD PRESSURE: 52 MMHG | OXYGEN SATURATION: 95 % | HEART RATE: 100 BPM | TEMPERATURE: 98.3 F | RESPIRATION RATE: 18 BRPM

## 2023-03-23 DIAGNOSIS — R10.13 ABDOMINAL PAIN, EPIGASTRIC: Primary | ICD-10-CM

## 2023-03-23 DIAGNOSIS — R11.0 NAUSEA: ICD-10-CM

## 2023-03-23 DIAGNOSIS — R19.7 DIARRHEA, UNSPECIFIED TYPE: ICD-10-CM

## 2023-03-23 LAB
ALBUMIN SERPL-MCNC: 4.2 GM/DL (ref 3.4–5)
ALP BLD-CCNC: 56 IU/L (ref 40–129)
ALT SERPL-CCNC: 41 U/L (ref 10–40)
ANION GAP SERPL CALCULATED.3IONS-SCNC: 8 MMOL/L (ref 4–16)
AST SERPL-CCNC: 21 IU/L (ref 15–37)
B-OH-BUTYR SERPL-MCNC: 0.1 MG/DL (ref 0–3)
BACTERIA: ABNORMAL /HPF
BASE EXCESS: 1 (ref 0–2.4)
BASOPHILS ABSOLUTE: 0 K/CU MM
BASOPHILS RELATIVE PERCENT: 0.4 % (ref 0–1)
BILIRUB SERPL-MCNC: 0.3 MG/DL (ref 0–1)
BILIRUBIN URINE: NEGATIVE MG/DL
BLOOD, URINE: NEGATIVE
BUN SERPL-MCNC: 12 MG/DL (ref 6–23)
CALCIUM SERPL-MCNC: 9.1 MG/DL (ref 8.3–10.6)
CHLORIDE BLD-SCNC: 101 MMOL/L (ref 99–110)
CLARITY: ABNORMAL
CO2: 26 MMOL/L (ref 21–32)
COLOR: YELLOW
COMMENT: ABNORMAL
CREAT SERPL-MCNC: 0.9 MG/DL (ref 0.6–1.1)
DIFFERENTIAL TYPE: ABNORMAL
EOSINOPHILS ABSOLUTE: 0.3 K/CU MM
EOSINOPHILS RELATIVE PERCENT: 3 % (ref 0–3)
GFR SERPL CREATININE-BSD FRML MDRD: >60 ML/MIN/1.73M2
GLUCOSE SERPL-MCNC: 253 MG/DL (ref 70–99)
GLUCOSE, URINE: >1000 MG/DL
GONADOTROPIN, CHORIONIC (HCG) QUANT: <1 UIU/ML
HCO3 VENOUS: 26.4 MMOL/L (ref 19–25)
HCT VFR BLD CALC: 44.6 % (ref 37–47)
HEMOGLOBIN: 14.1 GM/DL (ref 12.5–16)
IMMATURE NEUTROPHIL %: 0.3 % (ref 0–0.43)
KETONES, URINE: NEGATIVE MG/DL
LACTATE: 1.6 MMOL/L (ref 0.5–1.9)
LEUKOCYTE ESTERASE, URINE: NEGATIVE
LIPASE: 47 IU/L (ref 13–60)
LYMPHOCYTES ABSOLUTE: 3.3 K/CU MM
LYMPHOCYTES RELATIVE PERCENT: 33 % (ref 24–44)
MCH RBC QN AUTO: 28.7 PG (ref 27–31)
MCHC RBC AUTO-ENTMCNC: 31.6 % (ref 32–36)
MCV RBC AUTO: 90.7 FL (ref 78–100)
MONOCYTES ABSOLUTE: 0.6 K/CU MM
MONOCYTES RELATIVE PERCENT: 5.8 % (ref 0–4)
MUCUS: ABNORMAL HPF
NITRITE URINE, QUANTITATIVE: NEGATIVE
NUCLEATED RBC %: 0 %
O2 SAT, VEN: 88.2 % (ref 50–70)
PCO2, VEN: 55 MMHG (ref 38–52)
PDW BLD-RTO: 13.2 % (ref 11.7–14.9)
PH VENOUS: 7.29 (ref 7.32–7.42)
PH, URINE: 6 (ref 5–8)
PLATELET # BLD: 217 K/CU MM (ref 140–440)
PMV BLD AUTO: 12 FL (ref 7.5–11.1)
PO2, VEN: 99 MMHG (ref 28–48)
POTASSIUM SERPL-SCNC: 4.4 MMOL/L (ref 3.5–5.1)
PROTEIN UA: NEGATIVE MG/DL
RBC # BLD: 4.92 M/CU MM (ref 4.2–5.4)
RBC URINE: <1 /HPF (ref 0–6)
SEGMENTED NEUTROPHILS ABSOLUTE COUNT: 5.8 K/CU MM
SEGMENTED NEUTROPHILS RELATIVE PERCENT: 57.5 % (ref 36–66)
SODIUM BLD-SCNC: 135 MMOL/L (ref 135–145)
SPECIFIC GRAVITY UA: 1.02 (ref 1–1.03)
SQUAMOUS EPITHELIAL: 5 /HPF
TOTAL IMMATURE NEUTOROPHIL: 0.03 K/CU MM
TOTAL NUCLEATED RBC: 0 K/CU MM
TOTAL PROTEIN: 6.9 GM/DL (ref 6.4–8.2)
TRICHOMONAS: ABNORMAL /HPF
UROBILINOGEN, URINE: 0.2 MG/DL (ref 0.2–1)
WBC # BLD: 10.1 K/CU MM (ref 4–10.5)
WBC UA: 5 /HPF (ref 0–5)

## 2023-03-23 PROCEDURE — 6360000002 HC RX W HCPCS: Performed by: EMERGENCY MEDICINE

## 2023-03-23 PROCEDURE — 83690 ASSAY OF LIPASE: CPT

## 2023-03-23 PROCEDURE — 81001 URINALYSIS AUTO W/SCOPE: CPT

## 2023-03-23 PROCEDURE — 96375 TX/PRO/DX INJ NEW DRUG ADDON: CPT

## 2023-03-23 PROCEDURE — 2580000003 HC RX 258: Performed by: EMERGENCY MEDICINE

## 2023-03-23 PROCEDURE — 82805 BLOOD GASES W/O2 SATURATION: CPT

## 2023-03-23 PROCEDURE — 82010 KETONE BODYS QUAN: CPT

## 2023-03-23 PROCEDURE — 96374 THER/PROPH/DIAG INJ IV PUSH: CPT

## 2023-03-23 PROCEDURE — 84702 CHORIONIC GONADOTROPIN TEST: CPT

## 2023-03-23 PROCEDURE — 99284 EMERGENCY DEPT VISIT MOD MDM: CPT

## 2023-03-23 PROCEDURE — 80053 COMPREHEN METABOLIC PANEL: CPT

## 2023-03-23 PROCEDURE — 83605 ASSAY OF LACTIC ACID: CPT

## 2023-03-23 PROCEDURE — 85025 COMPLETE CBC W/AUTO DIFF WBC: CPT

## 2023-03-23 RX ORDER — 0.9 % SODIUM CHLORIDE 0.9 %
1000 INTRAVENOUS SOLUTION INTRAVENOUS ONCE
Status: COMPLETED | OUTPATIENT
Start: 2023-03-23 | End: 2023-03-23

## 2023-03-23 RX ORDER — KETOROLAC TROMETHAMINE 30 MG/ML
30 INJECTION, SOLUTION INTRAMUSCULAR; INTRAVENOUS ONCE
Status: COMPLETED | OUTPATIENT
Start: 2023-03-23 | End: 2023-03-23

## 2023-03-23 RX ORDER — ONDANSETRON 2 MG/ML
8 INJECTION INTRAMUSCULAR; INTRAVENOUS ONCE
Status: COMPLETED | OUTPATIENT
Start: 2023-03-23 | End: 2023-03-23

## 2023-03-23 RX ORDER — ONDANSETRON 4 MG/1
4 TABLET, FILM COATED ORAL 3 TIMES DAILY PRN
Qty: 15 TABLET | Refills: 0 | Status: SHIPPED | OUTPATIENT
Start: 2023-03-23

## 2023-03-23 RX ADMIN — SODIUM CHLORIDE 1000 ML: 9 INJECTION, SOLUTION INTRAVENOUS at 02:21

## 2023-03-23 RX ADMIN — ONDANSETRON 8 MG: 2 INJECTION INTRAMUSCULAR; INTRAVENOUS at 02:18

## 2023-03-23 RX ADMIN — KETOROLAC TROMETHAMINE 30 MG: 30 INJECTION, SOLUTION INTRAMUSCULAR; INTRAVENOUS at 02:18

## 2023-03-23 NOTE — ED PROVIDER NOTES
in respiratory distress and has clear lung sounds bilaterally. Lactate is normal.    Urinalysis shows no evidence of UTI at this time. No transaminitis, elevated lipase or hyperbilirubinemia. Patient sleeping on reevaluation with improving symptoms. Likely some type of enteritis based on overall presentation at this time. Plan for discharge home with Zofran, return for any new or worsening symptoms. She is agreeable with this plan of care. History from : Patient    Limitations to history : None    Patient was given the following medications:  Medications   0.9 % sodium chloride bolus (0 mLs IntraVENous Stopped 3/23/23 0321)   ondansetron (ZOFRAN) injection 8 mg (8 mg IntraVENous Given 3/23/23 0218)   ketorolac (TORADOL) injection 30 mg (30 mg IntraVENous Given 3/23/23 0218)       Independent Imaging Interpretation by me:     EKG (if obtained): (All EKG's are interpreted by myself in the absence of a cardiologist)     Chronic conditions affecting care: DM    Discussion with Other Profesionals : None    Social Determinants : None      Disposition Considerations (tests considered but not done, Shared Decision Making, Pt Expectation of Test or Tx.):     Appropriate for outpatient management      I am the Primary Clinician of Record. Clinical Impression:  1. Abdominal pain, epigastric    2. Diarrhea, unspecified type    3.  Nausea      (Please note that portions of this note may have been completed with a voice recognition program. Efforts were made to edit the dictations but occasionally words are mis-transcribed.)    MD Tori Bush MD  03/23/23 8191

## 2023-03-23 NOTE — DISCHARGE INSTRUCTIONS
Home Care Instructions: Abdominal Pain     Many things may cause abdominal pain. Your ER visit might not show the exact reason you are having pain. In some cases, additional time is needed to determine if the cause is serious. Therefore you may be told to go home and watch for any changes or worsening in your condition. Before that, we may not know if you need more testing, or if hospitalization or surgery is necessary. If its not something serious, the pain may go away without treatment or get better with simple things like avoiding certain foods or medications. In the ER, your doctor asks you questions, examines you and in some cases, may order tests. These help doctors decide if the pain is from something serious. Tests are not always done and may not provide a definite answer. There can still be a problem, even with normal test results. Abdominal pain may be caused by something serious (like appendicitis), which is not obvious right away. Because of this, another checkup is needed to make sure you are OK. It is VERY IMPORTANT to follow up for a repeat exam, especially if you have any symptoms that are not going away or are getting worse. We recommend that you RETURN TO THE EMERGENCY ROOM IN 8-12 HOURS to be rechecked. If you cannot, you may follow up with your primary care doctor or clinic. It is important that you follow all of the instructions below. RETURN TO THE EMERGENCY ROOM IMMEDIATELY IF:   The pain does not go away or gets worse. You have a fever. You keep throwing up and cannot keep anything down. You pass bloody or black stools. HOME CARE INSTRUCTIONS   Come back to the ER (or see your doctor) in 8-12 hours. DO NOT take laxatives unless directed by your doctor. Avoid the use of alcohol  Take pain medicine only as directed by your doctor. Only take over-the-counter or prescription medicine as directed by your doctor.    Try a clear liquid diet

## 2023-03-23 NOTE — LETTER
[facility] Emergency Department  [address] [address]  Phone: [phone]  Fax: [phone]               March 23, 2023    Patient: Nanci Eye   YOB: 2000   Date of Visit: 3/23/2023       To Whom It May Concern:    Mikaela Quezada was seen and treated in our emergency department on 3/23/2023. She may return to work on 3/25/2023.       Sincerely,       Raul Lauren RN         Signature:__________________________________

## 2023-05-01 ENCOUNTER — APPOINTMENT (OUTPATIENT)
Dept: GENERAL RADIOLOGY | Age: 23
End: 2023-05-01
Payer: COMMERCIAL

## 2023-05-01 ENCOUNTER — HOSPITAL ENCOUNTER (EMERGENCY)
Age: 23
Discharge: HOME | End: 2023-05-02
Payer: COMMERCIAL

## 2023-05-01 VITALS
DIASTOLIC BLOOD PRESSURE: 79 MMHG | HEART RATE: 94 BPM | OXYGEN SATURATION: 98 % | TEMPERATURE: 97.9 F | RESPIRATION RATE: 18 BRPM | SYSTOLIC BLOOD PRESSURE: 146 MMHG

## 2023-05-01 DIAGNOSIS — S93.402A SPRAIN OF LEFT ANKLE, UNSPECIFIED LIGAMENT, INITIAL ENCOUNTER: Primary | ICD-10-CM

## 2023-05-01 PROCEDURE — 99283 EMERGENCY DEPT VISIT LOW MDM: CPT

## 2023-05-01 PROCEDURE — 73610 X-RAY EXAM OF ANKLE: CPT

## 2023-05-01 RX ORDER — IBUPROFEN 400 MG/1
800 TABLET ORAL ONCE
Status: COMPLETED | OUTPATIENT
Start: 2023-05-02 | End: 2023-05-02

## 2023-05-01 ASSESSMENT — PAIN - FUNCTIONAL ASSESSMENT: PAIN_FUNCTIONAL_ASSESSMENT: 0-10

## 2023-05-01 ASSESSMENT — PAIN SCALES - GENERAL: PAINLEVEL_OUTOF10: 8

## 2023-05-02 PROCEDURE — 6370000000 HC RX 637 (ALT 250 FOR IP): Performed by: PHYSICIAN ASSISTANT

## 2023-05-02 RX ORDER — IBUPROFEN 800 MG/1
800 TABLET ORAL EVERY 6 HOURS PRN
Qty: 30 TABLET | Refills: 0 | Status: SHIPPED | OUTPATIENT
Start: 2023-05-02

## 2023-05-02 RX ADMIN — IBUPROFEN 800 MG: 400 TABLET ORAL at 00:00

## 2023-05-02 ASSESSMENT — PAIN DESCRIPTION - LOCATION: LOCATION: ANKLE

## 2023-05-02 ASSESSMENT — PAIN SCALES - GENERAL: PAINLEVEL_OUTOF10: 8

## 2023-05-02 ASSESSMENT — PAIN DESCRIPTION - DESCRIPTORS: DESCRIPTORS: DISCOMFORT

## 2023-05-02 ASSESSMENT — PAIN DESCRIPTION - ORIENTATION: ORIENTATION: LEFT

## 2023-05-02 NOTE — ED PROVIDER NOTES
ACE wrap applied. We discussed RICE. Rx Ibuprofen. FU with PCP or return as needed. Disposition Considerations (tests considered but not done, Shared Decision Making, Patient Expectation of Test or Treatment):   Appropriate for outpatient management as discussed. I am the Primary Clinician of Record. Supervising physician was Dr. Briana Short. Patient was seen independently. In light of current events, I did utilize appropriate PPE (including N95 and surgical face mask, safety glasses, and gloves, as recommended by the health facility/national standard best practice, during my bedside interactions with the patient. Final Impression      1.  Sprain of left ankle, unspecified ligament, initial encounter          DISPOSITION Decision To Discharge 05/02/2023 12:26:04 AM      [address] Carlo Steele, PA-C  [address] [place], [place]  05/02/23 0036

## 2023-05-02 NOTE — ED NOTES
Pt discharged from ED at this time with all necessary paperwork. All questions answered at this time and discharge instructions reviewed. Pt ambulates to waiting room.        Norbert Flores RN  05/02/23 3955

## 2023-09-22 ENCOUNTER — APPOINTMENT (OUTPATIENT)
Dept: CT IMAGING | Age: 23
End: 2023-09-22

## 2023-09-22 ENCOUNTER — APPOINTMENT (OUTPATIENT)
Dept: GENERAL RADIOLOGY | Age: 23
End: 2023-09-22

## 2023-09-22 ENCOUNTER — HOSPITAL ENCOUNTER (EMERGENCY)
Age: 23
Discharge: HOME | End: 2023-09-22

## 2023-09-22 VITALS
RESPIRATION RATE: 18 BRPM | OXYGEN SATURATION: 99 % | DIASTOLIC BLOOD PRESSURE: 93 MMHG | TEMPERATURE: 98.4 F | HEART RATE: 94 BPM | SYSTOLIC BLOOD PRESSURE: 142 MMHG

## 2023-09-22 DIAGNOSIS — R10.9 RIGHT SIDED ABDOMINAL PAIN: Primary | ICD-10-CM

## 2023-09-22 DIAGNOSIS — Z79.4 TYPE 2 DIABETES MELLITUS TREATED WITH INSULIN (HCC): ICD-10-CM

## 2023-09-22 DIAGNOSIS — R16.0 HEPATOMEGALY: ICD-10-CM

## 2023-09-22 DIAGNOSIS — E11.9 TYPE 2 DIABETES MELLITUS TREATED WITH INSULIN (HCC): ICD-10-CM

## 2023-09-22 DIAGNOSIS — K76.0 HEPATIC STEATOSIS: ICD-10-CM

## 2023-09-22 DIAGNOSIS — Z87.09 HISTORY OF ASTHMA: ICD-10-CM

## 2023-09-22 LAB
ALBUMIN SERPL-MCNC: 4.3 GM/DL (ref 3.4–5)
ALP BLD-CCNC: 65 IU/L (ref 40–129)
ALT SERPL-CCNC: 26 U/L (ref 10–40)
ANION GAP SERPL CALCULATED.3IONS-SCNC: 12 MMOL/L (ref 4–16)
AST SERPL-CCNC: 16 IU/L (ref 15–37)
BASOPHILS ABSOLUTE: 0.1 K/CU MM
BASOPHILS RELATIVE PERCENT: 0.4 % (ref 0–1)
BILIRUB SERPL-MCNC: 0.3 MG/DL (ref 0–1)
BILIRUBIN URINE: NEGATIVE MG/DL
BLOOD, URINE: NEGATIVE
BUN SERPL-MCNC: 7 MG/DL (ref 6–23)
CALCIUM SERPL-MCNC: 9.2 MG/DL (ref 8.3–10.6)
CHLORIDE BLD-SCNC: 102 MMOL/L (ref 99–110)
CHP ED QC CHECK: NORMAL
CLARITY: CLEAR
CO2: 25 MMOL/L (ref 21–32)
COLOR: YELLOW
COMMENT UA: ABNORMAL
CREAT SERPL-MCNC: 0.5 MG/DL (ref 0.6–1.1)
DIFFERENTIAL TYPE: ABNORMAL
EOSINOPHILS ABSOLUTE: 0.3 K/CU MM
EOSINOPHILS RELATIVE PERCENT: 2.2 % (ref 0–3)
GFR SERPL CREATININE-BSD FRML MDRD: >60 ML/MIN/1.73M2
GLUCOSE BLD-MCNC: 207 MG/DL
GLUCOSE BLD-MCNC: 207 MG/DL (ref 70–99)
GLUCOSE BLD-MCNC: 263 MG/DL (ref 70–99)
GLUCOSE SERPL-MCNC: 199 MG/DL (ref 70–99)
GLUCOSE, URINE: >1000 MG/DL
HCT VFR BLD CALC: 44.7 % (ref 37–47)
HEMOGLOBIN: 14.1 GM/DL (ref 12.5–16)
IMMATURE NEUTROPHIL %: 0.3 % (ref 0–0.43)
INTERPRETATION: NORMAL
KETONES, URINE: NEGATIVE MG/DL
LEUKOCYTE ESTERASE, URINE: NEGATIVE
LIPASE: 26 IU/L (ref 13–60)
LYMPHOCYTES ABSOLUTE: 4.3 K/CU MM
LYMPHOCYTES RELATIVE PERCENT: 33 % (ref 24–44)
MCH RBC QN AUTO: 28.3 PG (ref 27–31)
MCHC RBC AUTO-ENTMCNC: 31.5 % (ref 32–36)
MCV RBC AUTO: 89.6 FL (ref 78–100)
MONOCYTES ABSOLUTE: 0.9 K/CU MM
MONOCYTES RELATIVE PERCENT: 6.7 % (ref 0–4)
NITRITE URINE, QUANTITATIVE: NEGATIVE
NUCLEATED RBC %: 0 %
PDW BLD-RTO: 13.9 % (ref 11.7–14.9)
PH, URINE: 6 (ref 5–8)
PLATELET # BLD: 263 K/CU MM (ref 140–440)
PMV BLD AUTO: 12.2 FL (ref 7.5–11.1)
POTASSIUM SERPL-SCNC: 4.5 MMOL/L (ref 3.5–5.1)
PREGNANCY, URINE: NEGATIVE
PROTEIN UA: NEGATIVE MG/DL
RBC # BLD: 4.99 M/CU MM (ref 4.2–5.4)
SEGMENTED NEUTROPHILS ABSOLUTE COUNT: 7.5 K/CU MM
SEGMENTED NEUTROPHILS RELATIVE PERCENT: 57.4 % (ref 36–66)
SODIUM BLD-SCNC: 139 MMOL/L (ref 135–145)
SPECIFIC GRAVITY UA: 1.02 (ref 1–1.03)
TOTAL IMMATURE NEUTOROPHIL: 0.04 K/CU MM
TOTAL NUCLEATED RBC: 0 K/CU MM
TOTAL PROTEIN: 6.9 GM/DL (ref 6.4–8.2)
TROPONIN T: <0.01 NG/ML
UROBILINOGEN, URINE: 0.2 MG/DL (ref 0.2–1)
WBC # BLD: 13 K/CU MM (ref 4–10.5)

## 2023-09-22 PROCEDURE — 99285 EMERGENCY DEPT VISIT HI MDM: CPT

## 2023-09-22 PROCEDURE — 82962 GLUCOSE BLOOD TEST: CPT

## 2023-09-22 PROCEDURE — 83690 ASSAY OF LIPASE: CPT

## 2023-09-22 PROCEDURE — 81025 URINE PREGNANCY TEST: CPT

## 2023-09-22 PROCEDURE — 74177 CT ABD & PELVIS W/CONTRAST: CPT

## 2023-09-22 PROCEDURE — 71045 X-RAY EXAM CHEST 1 VIEW: CPT

## 2023-09-22 PROCEDURE — 84484 ASSAY OF TROPONIN QUANT: CPT

## 2023-09-22 PROCEDURE — 80053 COMPREHEN METABOLIC PANEL: CPT

## 2023-09-22 PROCEDURE — 81003 URINALYSIS AUTO W/O SCOPE: CPT

## 2023-09-22 PROCEDURE — 6360000004 HC RX CONTRAST MEDICATION: Performed by: PHYSICIAN ASSISTANT

## 2023-09-22 PROCEDURE — 85025 COMPLETE CBC W/AUTO DIFF WBC: CPT

## 2023-09-22 RX ORDER — ALBUTEROL SULFATE 90 UG/1
2 AEROSOL, METERED RESPIRATORY (INHALATION) 4 TIMES DAILY PRN
Qty: 54 G | Refills: 1 | Status: SHIPPED | OUTPATIENT
Start: 2023-09-22 | End: 2023-09-22 | Stop reason: SDUPTHER

## 2023-09-22 RX ORDER — ALBUTEROL SULFATE 90 UG/1
2 AEROSOL, METERED RESPIRATORY (INHALATION) 4 TIMES DAILY PRN
Qty: 54 G | Refills: 1 | Status: SHIPPED | OUTPATIENT
Start: 2023-09-22

## 2023-09-22 RX ADMIN — IOPAMIDOL 75 ML: 755 INJECTION, SOLUTION INTRAVENOUS at 21:06

## 2023-09-22 NOTE — ED PROVIDER NOTES
[address] ENCOUNTER        Pt Name: Kiah Gonzalez  MRN: [id number]  [address] 2000  Date of evaluation: 9/22/2023  Provider: Lisa Ochoa PA-C  PCP: Falguni French MD      DEISY. I have evaluated this patient. CHIEF COMPLAINT      Chief Complaint   Patient presents with    Abdominal Pain     RLQ pain and swelling x 1 week       HISTORY OF PRESENT ILLNESS:     History from : Patient    Limitations to history : None    Kiah Gonzalez is a 25 y.o. female known history of insulin-dependent diabetes and past medical history of pancreatitis who presents with c/o abdominal pain x1 week. Compares it to past year when she was admitted for pancreas issue. Also noticed episode of hypoglycemia in 50s this morning that treat with pb sandwich. At that time when her blood sugars were low, feels light headed and very weakness. Cough x 3days and states that her grandmother told her that she is wheezing. Discussion she also mentions she has noticed some swelling on her right side of her abdomen. She does mention a history of asthma, but is currently out of her inhaler. She denies any nausea, vomiting, flank pain, dysuria, urinary changes, melena, diarrhea, chest pain, mopped assist, sore throat, sick contacts, vaginal discharge, dyspareunia        Nursing Notes were all reviewed and agreed with or any disagreements were addressed in the HPI. REVIEW OF SYSTEMS :     Review of Systems   Constitutional:  Negative for fever. Gastrointestinal:  Positive for abdominal pain. Genitourinary:  Negative for dyspareunia and pelvic pain. Pertinent positives and negatives are stated within HPI    PAST HISTORY   has a past medical history of ADHD (attention deficit hyperactivity disorder), Diabetes mellitus ([address]), Hyperlipidemia, and Reflux.     Past Surgical History:   Procedure Laterality Date    TYMPANOSTOMY TUBE she in discussion with her after her work-up, she does mention that one of the reasons why she came in was that she would need a work excuse as well. We did have discussion with her with reassuring work-up and okay for outpatient management. Condition is: mild stable chronic illness    Discussion with Other Professionals : As indicated in SUMMARY,  [address], [address], MDM and Case Management RN Kian     External Records Reviewed : Inpatient Notes    Escalation of care, including admission/OBS considered : I considered admission/observation given patient's abdominal pain, however work up is reassuring, no tachycardia, patient afebrile, tolerating PO, imaging reassuring, repeat exam reassuring. Patient safe for outpatient management. Patient was discharged with follow up instructions and strict return precautions    Disposition Considerations (tests considered but not done, Shared Decision Making, Pt Expectation of Test or Tx.): I considered providing antibiotics, however less concerning for bacterial infection. Disposition: Discussed need to follow up diagnostics, including incidental findings. Discharged with instructions to obtain outpatient follow up of patient's symptoms and findings, with strict return precautions if patient develops new or worsening symptoms. Follow-up plan and return precautions were provided and discussed in detail patient in agreement. Chronic conditions affecting care:    has a past medical history of ADHD (attention deficit hyperactivity disorder), Diabetes mellitus ([address]), Hyperlipidemia, and Reflux. Social Determinants Significantly Affecting  Health/ Social Determinants of Health : Patient has / had difficulty affording medications   Payor: /         I am the Primary Clinician of Record. Is this patient to be included in the SEP-1 Core Measure due to severe sepsis or septic shock?    No   Exclusion criteria - the patient is NOT to be included for SEP-1 Core

## 2023-09-23 ASSESSMENT — ENCOUNTER SYMPTOMS: ABDOMINAL PAIN: 1

## 2023-09-23 NOTE — DISCHARGE INSTRUCTIONS
As we discussed, contact your primary care provider, and the gastroenterologist to discuss your history of pancreatitis as well as your visit to the emergency department and your CAT scan (hepatic steatosis and hepatic megaly)    Continue to follow-up with your doctors. For ongoing management and evaluation of your diabetes. Return with any uncontrollable vomiting, fever, worsening abdominal pain, passing out, worsening symptoms or any new concerns.

## 2023-09-23 NOTE — CARE COORDINATION
CM - Disaster room -held. Pt has most medications at home but is in need of one rescue inhaler . ---Albuterol Sulfate . She is not listed on the Hold list, --call made to Nikhil Hand at [place] after hours . Pt is applying for [employer] after being taken off for unknown reasons.  cannot use [facility] outpatient [facility] -closed until Monday. If  does not receive  call tonight (currently 22:50-Friday night ) will advise pt to return after 11 AM for additional assist from  --possibly to [facility] . Pt to follow up with Med assist -flier given .

## 2023-09-23 NOTE — CARE COORDINATION
This CM completed the Prescription, needing help to cover cost. CM contacted [employer] [phone]. Voucher and RX faxed to [phone], call to Pharmacy to update.  Call to pt to update on picking up Rx. AIDEN, RN/CM

## 2023-09-25 NOTE — CARE COORDINATION
09/23/23 Received referral from CM to assist with medication at discharge. Approved a 1x voucher for albuterol inhaler. CM faxed to [facility].    Added pt to flagged list.  If pt requests additional help she will need to complete a Med Assist application and provide required documents to determine if she qualifies for ongoing assistance.

## 2024-03-26 LAB
ALBUMIN SERPL-MCNC: 3.9 GM/DL (ref 3.4–5)
ALP BLD-CCNC: 72 IU/L (ref 40–128)
ALT SERPL-CCNC: 8 U/L (ref 10–40)
ANION GAP SERPL CALCULATED.3IONS-SCNC: 14 MMOL/L (ref 7–16)
AST SERPL-CCNC: 5 IU/L (ref 15–37)
B-OH-BUTYR SERPL-MCNC: <2.1 MG/DL (ref 0–3)
BASE EXCESS: 2 (ref 0–3)
BASOPHILS ABSOLUTE: 0 K/CU MM
BASOPHILS RELATIVE PERCENT: 0.4 % (ref 0–1)
BILIRUB SERPL-MCNC: 0.5 MG/DL (ref 0–1)
BILIRUBIN URINE: NEGATIVE MG/DL
BLOOD, URINE: NEGATIVE
BUN SERPL-MCNC: 9 MG/DL (ref 6–23)
CALCIUM SERPL-MCNC: 8.9 MG/DL (ref 8.3–10.6)
CHLORIDE BLD-SCNC: 99 MMOL/L (ref 99–110)
CLARITY: CLEAR
CO2: 21 MMOL/L (ref 21–32)
COLOR: YELLOW
COMMENT UA: ABNORMAL
COMMENT: ABNORMAL
CREAT SERPL-MCNC: 0.5 MG/DL (ref 0.6–1.1)
DIFFERENTIAL TYPE: ABNORMAL
EOSINOPHILS ABSOLUTE: 0.2 K/CU MM
EOSINOPHILS RELATIVE PERCENT: 1.9 % (ref 0–3)
GFR SERPL CREATININE-BSD FRML MDRD: >90 ML/MIN/1.73M2
GLUCOSE BLD-MCNC: 447 MG/DL (ref 70–99)
GLUCOSE SERPL-MCNC: 446 MG/DL (ref 70–99)
GLUCOSE, URINE: >1000 MG/DL
GONADOTROPIN, CHORIONIC (HCG) QUANT: <1 UIU/ML
HCO3 VENOUS: 23.1 MMOL/L (ref 22–29)
HCT VFR BLD CALC: 43.4 % (ref 37–47)
HEMOGLOBIN: 14.8 GM/DL (ref 12.5–16)
IMMATURE NEUTROPHIL %: 0.2 % (ref 0–0.43)
KETONES, URINE: NEGATIVE MG/DL
LEUKOCYTE ESTERASE, URINE: NEGATIVE
LIPASE: 26 IU/L (ref 13–60)
LYMPHOCYTES ABSOLUTE: 3 K/CU MM
LYMPHOCYTES RELATIVE PERCENT: 35.3 % (ref 24–44)
MCH RBC QN AUTO: 29.7 PG (ref 27–31)
MCHC RBC AUTO-ENTMCNC: 34.1 % (ref 32–36)
MCV RBC AUTO: 87 FL (ref 78–100)
MONOCYTES ABSOLUTE: 0.4 K/CU MM
MONOCYTES RELATIVE PERCENT: 4.8 % (ref 0–4)
NITRITE URINE, QUANTITATIVE: NEGATIVE
NUCLEATED RBC %: 0 %
O2 SAT, VEN: 93.5 % (ref 50–70)
PCO2, VEN: 40 MMHG (ref 41–51)
PDW BLD-RTO: 12.3 % (ref 11.7–14.9)
PH VENOUS: 7.37 (ref 7.32–7.43)
PH, URINE: 6 (ref 5–8)
PLATELET # BLD: 228 K/CU MM (ref 140–440)
PMV BLD AUTO: 13 FL (ref 7.5–11.1)
PO2, VEN: 109 MMHG (ref 28–48)
POTASSIUM SERPL-SCNC: 4.2 MMOL/L (ref 3.5–5.1)
PROTEIN UA: NEGATIVE MG/DL
RBC # BLD: 4.99 M/CU MM (ref 4.2–5.4)
SEGMENTED NEUTROPHILS ABSOLUTE COUNT: 4.9 K/CU MM
SEGMENTED NEUTROPHILS RELATIVE PERCENT: 57.4 % (ref 36–66)
SODIUM BLD-SCNC: 134 MMOL/L (ref 135–145)
SPECIFIC GRAVITY UA: 1.01 (ref 1–1.03)
TOTAL IMMATURE NEUTOROPHIL: 0.02 K/CU MM
TOTAL NUCLEATED RBC: 0 K/CU MM
TOTAL PROTEIN: 7.2 GM/DL (ref 6.4–8.2)
UROBILINOGEN, URINE: 0.2 MG/DL (ref 0.2–1)
WBC # BLD: 8.5 K/CU MM (ref 4–10.5)

## 2024-03-26 PROCEDURE — 80053 COMPREHEN METABOLIC PANEL: CPT

## 2024-03-26 PROCEDURE — 82010 KETONE BODYS QUAN: CPT

## 2024-03-26 PROCEDURE — 99285 EMERGENCY DEPT VISIT HI MDM: CPT

## 2024-03-26 PROCEDURE — 81003 URINALYSIS AUTO W/O SCOPE: CPT

## 2024-03-26 PROCEDURE — 84702 CHORIONIC GONADOTROPIN TEST: CPT

## 2024-03-26 PROCEDURE — 83690 ASSAY OF LIPASE: CPT

## 2024-03-26 PROCEDURE — 96374 THER/PROPH/DIAG INJ IV PUSH: CPT

## 2024-03-26 PROCEDURE — 82805 BLOOD GASES W/O2 SATURATION: CPT

## 2024-03-26 PROCEDURE — 85025 COMPLETE CBC W/AUTO DIFF WBC: CPT

## 2024-03-26 PROCEDURE — 82962 GLUCOSE BLOOD TEST: CPT

## 2024-03-26 RX ORDER — 0.9 % SODIUM CHLORIDE 0.9 %
1000 INTRAVENOUS SOLUTION INTRAVENOUS ONCE
Status: COMPLETED | OUTPATIENT
Start: 2024-03-26 | End: 2024-03-27

## 2024-03-26 RX ORDER — FLUCONAZOLE 100 MG/1
200 TABLET ORAL ONCE
Status: COMPLETED | OUTPATIENT
Start: 2024-03-27 | End: 2024-03-27

## 2024-03-27 ENCOUNTER — APPOINTMENT (OUTPATIENT)
Dept: CT IMAGING | Age: 24
End: 2024-03-27
Payer: COMMERCIAL

## 2024-03-27 ENCOUNTER — HOSPITAL ENCOUNTER (EMERGENCY)
Age: 24
Discharge: HOME | End: 2024-03-27
Attending: STUDENT IN AN ORGANIZED HEALTH CARE EDUCATION/TRAINING PROGRAM
Payer: COMMERCIAL

## 2024-03-27 VITALS
OXYGEN SATURATION: 97 % | SYSTOLIC BLOOD PRESSURE: 117 MMHG | RESPIRATION RATE: 19 BRPM | DIASTOLIC BLOOD PRESSURE: 88 MMHG | HEART RATE: 95 BPM | TEMPERATURE: 98.7 F

## 2024-03-27 DIAGNOSIS — B37.9 YEAST INFECTION: ICD-10-CM

## 2024-03-27 DIAGNOSIS — R73.9 HYPERGLYCEMIA: Primary | ICD-10-CM

## 2024-03-27 DIAGNOSIS — K62.5 BRIGHT RED BLOOD PER RECTUM: ICD-10-CM

## 2024-03-27 LAB — GLUCOSE BLD-MCNC: 216 MG/DL (ref 70–99)

## 2024-03-27 PROCEDURE — 6360000002 HC RX W HCPCS: Performed by: STUDENT IN AN ORGANIZED HEALTH CARE EDUCATION/TRAINING PROGRAM

## 2024-03-27 PROCEDURE — 6370000000 HC RX 637 (ALT 250 FOR IP): Performed by: STUDENT IN AN ORGANIZED HEALTH CARE EDUCATION/TRAINING PROGRAM

## 2024-03-27 PROCEDURE — 74177 CT ABD & PELVIS W/CONTRAST: CPT

## 2024-03-27 PROCEDURE — 82962 GLUCOSE BLOOD TEST: CPT

## 2024-03-27 PROCEDURE — 6360000004 HC RX CONTRAST MEDICATION: Performed by: STUDENT IN AN ORGANIZED HEALTH CARE EDUCATION/TRAINING PROGRAM

## 2024-03-27 PROCEDURE — 2580000003 HC RX 258: Performed by: STUDENT IN AN ORGANIZED HEALTH CARE EDUCATION/TRAINING PROGRAM

## 2024-03-27 PROCEDURE — C9113 INJ PANTOPRAZOLE SODIUM, VIA: HCPCS | Performed by: STUDENT IN AN ORGANIZED HEALTH CARE EDUCATION/TRAINING PROGRAM

## 2024-03-27 RX ORDER — FLUCONAZOLE 100 MG/1
200 TABLET ORAL DAILY
Qty: 6 TABLET | Refills: 0 | Status: SHIPPED | OUTPATIENT
Start: 2024-03-27 | End: 2024-03-30

## 2024-03-27 RX ORDER — SODIUM CHLORIDE 0.9 % (FLUSH) 0.9 %
5-40 SYRINGE (ML) INJECTION 2 TIMES DAILY
Status: DISCONTINUED | OUTPATIENT
Start: 2024-03-27 | End: 2024-03-27 | Stop reason: HOSPADM

## 2024-03-27 RX ORDER — PANTOPRAZOLE SODIUM 40 MG/10ML
40 INJECTION, POWDER, LYOPHILIZED, FOR SOLUTION INTRAVENOUS ONCE
Status: COMPLETED | OUTPATIENT
Start: 2024-03-27 | End: 2024-03-27

## 2024-03-27 RX ORDER — PANTOPRAZOLE SODIUM 40 MG/1
40 TABLET, DELAYED RELEASE ORAL
Qty: 14 TABLET | Refills: 0 | Status: SHIPPED | OUTPATIENT
Start: 2024-03-27 | End: 2024-04-10

## 2024-03-27 RX ADMIN — PANTOPRAZOLE SODIUM 40 MG: 40 INJECTION, POWDER, FOR SOLUTION INTRAVENOUS at 01:29

## 2024-03-27 RX ADMIN — IOPAMIDOL 75 ML: 755 INJECTION, SOLUTION INTRAVENOUS at 00:14

## 2024-03-27 RX ADMIN — FLUCONAZOLE 200 MG: 100 TABLET ORAL at 01:29

## 2024-03-27 RX ADMIN — SODIUM CHLORIDE 1000 ML: 9 INJECTION, SOLUTION INTRAVENOUS at 01:28

## 2024-03-27 ASSESSMENT — ENCOUNTER SYMPTOMS
ABDOMINAL PAIN: 1
NAUSEA: 0
SHORTNESS OF BREATH: 0
SORE THROAT: 0
VOMITING: 0
BLOOD IN STOOL: 1
COUGH: 0

## 2024-03-27 NOTE — ED PROVIDER NOTES
Disp-6 tablet, R-0Normal      pantoprazole (PROTONIX) 40 MG tablet Take 1 tablet by mouth every morning (before breakfast) for 14 days, Disp-14 tablet, R-0Normal             Bj Bhardwaj DO  Emergency Medicine    (Please note that portions of this note were completed with a voice recognition program.  Efforts were made to edit the dictations but occasionally words are mis-transcribed.)        Bj Bhardwaj,   03/27/24 0546

## 2024-03-27 NOTE — DISCHARGE INSTRUCTIONS
You are seen here today for abdominal pain, yeast infection and elevated blood sugars.  Your blood sugars have come down with fluids, your lab work and CAT scan are otherwise unremarkable.    I am prescribing you Diflucan for your yeast infection.  I am also prescribing you Protonix for your abdominal pain and bloody stools.    Please return immediately to the emergency department if you continue to have bloody stools or black stools, worsening abdominal pain, fevers, blood sugars elevated over 400, passing out or any other new worsening or concerning complaints.  Please avoid all sugary foods, fruits, Danilo-Aid, bread, pasta, rice.  Discussed with your PCP if you need diabetic medications to be adjusted and discuss following up with a diabetic educator.    For your abdominal pain and bloody stools please follow-up with gastroenterology as soon as possible

## 2024-05-24 ENCOUNTER — APPOINTMENT (OUTPATIENT)
Dept: ULTRASOUND IMAGING | Age: 24
End: 2024-05-24
Payer: COMMERCIAL

## 2024-05-24 ENCOUNTER — HOSPITAL ENCOUNTER (EMERGENCY)
Age: 24
Discharge: HOME | End: 2024-05-24
Attending: STUDENT IN AN ORGANIZED HEALTH CARE EDUCATION/TRAINING PROGRAM | Admitting: STUDENT IN AN ORGANIZED HEALTH CARE EDUCATION/TRAINING PROGRAM
Payer: COMMERCIAL

## 2024-05-24 ENCOUNTER — APPOINTMENT (OUTPATIENT)
Dept: CT IMAGING | Age: 24
End: 2024-05-24
Payer: COMMERCIAL

## 2024-05-24 VITALS
TEMPERATURE: 98.8 F | SYSTOLIC BLOOD PRESSURE: 120 MMHG | HEART RATE: 78 BPM | DIASTOLIC BLOOD PRESSURE: 70 MMHG | OXYGEN SATURATION: 95 % | RESPIRATION RATE: 16 BRPM

## 2024-05-24 DIAGNOSIS — K76.0 HEPATIC STEATOSIS: ICD-10-CM

## 2024-05-24 DIAGNOSIS — K85.90 ACUTE PANCREATITIS, UNSPECIFIED COMPLICATION STATUS, UNSPECIFIED PANCREATITIS TYPE: Primary | ICD-10-CM

## 2024-05-24 DIAGNOSIS — R73.9 HYPERGLYCEMIA: ICD-10-CM

## 2024-05-24 LAB
ALBUMIN SERPL-MCNC: 4.4 GM/DL (ref 3.4–5)
ALP BLD-CCNC: 72 IU/L (ref 40–128)
ALT SERPL-CCNC: 56 U/L (ref 10–40)
ANION GAP SERPL CALCULATED.3IONS-SCNC: 14 MMOL/L (ref 7–16)
AST SERPL-CCNC: 30 IU/L (ref 15–37)
BASOPHILS ABSOLUTE: 0 K/CU MM
BASOPHILS RELATIVE PERCENT: 0.4 % (ref 0–1)
BILIRUB SERPL-MCNC: 0.6 MG/DL (ref 0–1)
BUN SERPL-MCNC: 9 MG/DL (ref 6–23)
CALCIUM SERPL-MCNC: 8.8 MG/DL (ref 8.3–10.6)
CHLORIDE BLD-SCNC: 100 MMOL/L (ref 99–110)
CO2: 25 MMOL/L (ref 21–32)
CREAT SERPL-MCNC: 0.6 MG/DL (ref 0.6–1.1)
DIFFERENTIAL TYPE: ABNORMAL
EOSINOPHILS ABSOLUTE: 0.1 K/CU MM
EOSINOPHILS RELATIVE PERCENT: 1.8 % (ref 0–3)
GFR, ESTIMATED: >90 ML/MIN/1.73M2
GLUCOSE BLD-MCNC: 291 MG/DL (ref 70–99)
GLUCOSE SERPL-MCNC: 299 MG/DL (ref 70–99)
HCT VFR BLD CALC: 43.3 % (ref 37–47)
HEMOGLOBIN: 14.3 GM/DL (ref 12.5–16)
IMMATURE NEUTROPHIL %: 0.4 % (ref 0–0.43)
LIPASE: 2664 IU/L (ref 13–60)
LYMPHOCYTES ABSOLUTE: 2.8 K/CU MM
LYMPHOCYTES RELATIVE PERCENT: 38.7 % (ref 24–44)
MCH RBC QN AUTO: 28.7 PG (ref 27–31)
MCHC RBC AUTO-ENTMCNC: 33 % (ref 32–36)
MCV RBC AUTO: 86.8 FL (ref 78–100)
MONOCYTES ABSOLUTE: 0.6 K/CU MM
MONOCYTES RELATIVE PERCENT: 7.6 % (ref 0–4)
NEUTROPHILS ABSOLUTE: 3.8 K/CU MM
NEUTROPHILS RELATIVE PERCENT: 51.1 % (ref 36–66)
NUCLEATED RBC %: 0 %
PDW BLD-RTO: 12.3 % (ref 11.7–14.9)
PLATELET # BLD: 171 K/CU MM (ref 140–440)
PMV BLD AUTO: 12.7 FL (ref 7.5–11.1)
POTASSIUM SERPL-SCNC: 4.2 MMOL/L (ref 3.5–5.1)
PREGNANCY, SERUM: NEGATIVE
RBC # BLD: 4.99 M/CU MM (ref 4.2–5.4)
SODIUM BLD-SCNC: 139 MMOL/L (ref 135–145)
TOTAL IMMATURE NEUTOROPHIL: 0.03 K/CU MM
TOTAL NUCLEATED RBC: 0 K/CU MM
TOTAL PROTEIN: 7 GM/DL (ref 6.4–8.2)
WBC # BLD: 7.3 K/CU MM (ref 4–10.5)

## 2024-05-24 PROCEDURE — 96361 HYDRATE IV INFUSION ADD-ON: CPT

## 2024-05-24 PROCEDURE — 6360000004 HC RX CONTRAST MEDICATION: Performed by: PHYSICIAN ASSISTANT

## 2024-05-24 PROCEDURE — 1200000000 HC SEMI PRIVATE

## 2024-05-24 PROCEDURE — 2500000003 HC RX 250 WO HCPCS: Performed by: PHYSICIAN ASSISTANT

## 2024-05-24 PROCEDURE — 96375 TX/PRO/DX INJ NEW DRUG ADDON: CPT

## 2024-05-24 PROCEDURE — 96372 THER/PROPH/DIAG INJ SC/IM: CPT

## 2024-05-24 PROCEDURE — 83690 ASSAY OF LIPASE: CPT

## 2024-05-24 PROCEDURE — 6360000002 HC RX W HCPCS: Performed by: PHYSICIAN ASSISTANT

## 2024-05-24 PROCEDURE — 84703 CHORIONIC GONADOTROPIN ASSAY: CPT

## 2024-05-24 PROCEDURE — 2580000003 HC RX 258: Performed by: PHYSICIAN ASSISTANT

## 2024-05-24 PROCEDURE — 76705 ECHO EXAM OF ABDOMEN: CPT

## 2024-05-24 PROCEDURE — 96374 THER/PROPH/DIAG INJ IV PUSH: CPT

## 2024-05-24 PROCEDURE — 80053 COMPREHEN METABOLIC PANEL: CPT

## 2024-05-24 PROCEDURE — 74177 CT ABD & PELVIS W/CONTRAST: CPT

## 2024-05-24 PROCEDURE — 82962 GLUCOSE BLOOD TEST: CPT

## 2024-05-24 PROCEDURE — 99285 EMERGENCY DEPT VISIT HI MDM: CPT

## 2024-05-24 PROCEDURE — 85025 COMPLETE CBC W/AUTO DIFF WBC: CPT

## 2024-05-24 RX ORDER — ONDANSETRON 4 MG/1
4 TABLET, ORALLY DISINTEGRATING ORAL EVERY 8 HOURS PRN
Status: CANCELLED | OUTPATIENT
Start: 2024-05-24

## 2024-05-24 RX ORDER — POTASSIUM CHLORIDE 7.45 MG/ML
10 INJECTION INTRAVENOUS PRN
Status: CANCELLED | OUTPATIENT
Start: 2024-05-24

## 2024-05-24 RX ORDER — MORPHINE SULFATE 2 MG/ML
2 INJECTION, SOLUTION INTRAMUSCULAR; INTRAVENOUS EVERY 4 HOURS PRN
Status: CANCELLED | OUTPATIENT
Start: 2024-05-24 | End: 2024-05-25

## 2024-05-24 RX ORDER — ONDANSETRON 2 MG/ML
4 INJECTION INTRAMUSCULAR; INTRAVENOUS EVERY 30 MIN PRN
Status: DISCONTINUED | OUTPATIENT
Start: 2024-05-24 | End: 2024-05-24 | Stop reason: HOSPADM

## 2024-05-24 RX ORDER — ENOXAPARIN SODIUM 100 MG/ML
40 INJECTION SUBCUTANEOUS DAILY
Status: CANCELLED | OUTPATIENT
Start: 2024-05-24

## 2024-05-24 RX ORDER — MAGNESIUM SULFATE IN WATER 40 MG/ML
2000 INJECTION, SOLUTION INTRAVENOUS PRN
Status: CANCELLED | OUTPATIENT
Start: 2024-05-24

## 2024-05-24 RX ORDER — HYDROCODONE BITARTRATE AND ACETAMINOPHEN 5; 325 MG/1; MG/1
1-2 TABLET ORAL EVERY 4 HOURS PRN
Qty: 10 TABLET | Refills: 0 | Status: CANCELLED | OUTPATIENT
Start: 2024-05-24 | End: 2024-05-29

## 2024-05-24 RX ORDER — ONDANSETRON 2 MG/ML
4 INJECTION INTRAMUSCULAR; INTRAVENOUS EVERY 6 HOURS PRN
Status: CANCELLED | OUTPATIENT
Start: 2024-05-24

## 2024-05-24 RX ORDER — SODIUM CHLORIDE 0.9 % (FLUSH) 0.9 %
5-40 SYRINGE (ML) INJECTION PRN
Status: CANCELLED | OUTPATIENT
Start: 2024-05-24

## 2024-05-24 RX ORDER — INSULIN LISPRO 100 [IU]/ML
5 INJECTION, SOLUTION INTRAVENOUS; SUBCUTANEOUS
Status: CANCELLED | OUTPATIENT
Start: 2024-05-24

## 2024-05-24 RX ORDER — DEXTROSE MONOHYDRATE 100 MG/ML
INJECTION, SOLUTION INTRAVENOUS CONTINUOUS PRN
Status: CANCELLED | OUTPATIENT
Start: 2024-05-24

## 2024-05-24 RX ORDER — ATORVASTATIN CALCIUM 10 MG/1
10 TABLET, FILM COATED ORAL DAILY
Status: CANCELLED | OUTPATIENT
Start: 2024-05-24

## 2024-05-24 RX ORDER — POLYETHYLENE GLYCOL 3350 17 G/17G
17 POWDER, FOR SOLUTION ORAL DAILY PRN
Status: CANCELLED | OUTPATIENT
Start: 2024-05-24

## 2024-05-24 RX ORDER — INSULIN LISPRO 100 [IU]/ML
0-4 INJECTION, SOLUTION INTRAVENOUS; SUBCUTANEOUS
Status: CANCELLED | OUTPATIENT
Start: 2024-05-24

## 2024-05-24 RX ORDER — INSULIN LISPRO 100 [IU]/ML
0-4 INJECTION, SOLUTION INTRAVENOUS; SUBCUTANEOUS NIGHTLY
Status: CANCELLED | OUTPATIENT
Start: 2024-05-24

## 2024-05-24 RX ORDER — ALBUTEROL SULFATE 90 UG/1
2 AEROSOL, METERED RESPIRATORY (INHALATION)
Status: CANCELLED | OUTPATIENT
Start: 2024-05-24

## 2024-05-24 RX ORDER — FAMOTIDINE 10 MG/ML
20 INJECTION, SOLUTION INTRAVENOUS ONCE
Status: COMPLETED | OUTPATIENT
Start: 2024-05-24 | End: 2024-05-24

## 2024-05-24 RX ORDER — DICYCLOMINE HYDROCHLORIDE 10 MG/ML
20 INJECTION INTRAMUSCULAR ONCE
Status: COMPLETED | OUTPATIENT
Start: 2024-05-24 | End: 2024-05-24

## 2024-05-24 RX ORDER — SODIUM CHLORIDE 9 MG/ML
INJECTION, SOLUTION INTRAVENOUS CONTINUOUS
Status: CANCELLED | OUTPATIENT
Start: 2024-05-24 | End: 2024-06-05

## 2024-05-24 RX ORDER — INSULIN GLARGINE 100 [IU]/ML
18 INJECTION, SOLUTION SUBCUTANEOUS NIGHTLY
Status: CANCELLED | OUTPATIENT
Start: 2024-05-24

## 2024-05-24 RX ORDER — POTASSIUM CHLORIDE 20 MEQ/1
40 TABLET, EXTENDED RELEASE ORAL PRN
Status: CANCELLED | OUTPATIENT
Start: 2024-05-24

## 2024-05-24 RX ORDER — GLUCAGON 1 MG/ML
1 KIT INJECTION PRN
Status: CANCELLED | OUTPATIENT
Start: 2024-05-24

## 2024-05-24 RX ORDER — 0.9 % SODIUM CHLORIDE 0.9 %
1000 INTRAVENOUS SOLUTION INTRAVENOUS ONCE
Status: COMPLETED | OUTPATIENT
Start: 2024-05-24 | End: 2024-05-24

## 2024-05-24 RX ORDER — SODIUM CHLORIDE 0.9 % (FLUSH) 0.9 %
5-40 SYRINGE (ML) INJECTION EVERY 12 HOURS SCHEDULED
Status: CANCELLED | OUTPATIENT
Start: 2024-05-24

## 2024-05-24 RX ORDER — FENTANYL CITRATE 50 UG/ML
25 INJECTION, SOLUTION INTRAMUSCULAR; INTRAVENOUS ONCE
Status: COMPLETED | OUTPATIENT
Start: 2024-05-24 | End: 2024-05-24

## 2024-05-24 RX ORDER — HYDROCODONE BITARTRATE AND ACETAMINOPHEN 5; 325 MG/1; MG/1
1-2 TABLET ORAL EVERY 4 HOURS PRN
Qty: 10 TABLET | Refills: 0 | Status: SHIPPED | OUTPATIENT
Start: 2024-05-24 | End: 2024-05-29

## 2024-05-24 RX ORDER — SODIUM CHLORIDE 9 MG/ML
INJECTION, SOLUTION INTRAVENOUS PRN
Status: CANCELLED | OUTPATIENT
Start: 2024-05-24

## 2024-05-24 RX ADMIN — IOPAMIDOL 80 ML: 755 INJECTION, SOLUTION INTRAVENOUS at 11:35

## 2024-05-24 RX ADMIN — FAMOTIDINE 20 MG: 10 INJECTION, SOLUTION INTRAVENOUS at 10:00

## 2024-05-24 RX ADMIN — FENTANYL CITRATE 25 MCG: 50 INJECTION, SOLUTION INTRAMUSCULAR; INTRAVENOUS at 11:51

## 2024-05-24 RX ADMIN — DICYCLOMINE HYDROCHLORIDE 20 MG: 10 INJECTION, SOLUTION INTRAMUSCULAR at 09:59

## 2024-05-24 RX ADMIN — SODIUM CHLORIDE 1000 ML: 9 INJECTION, SOLUTION INTRAVENOUS at 10:00

## 2024-05-24 ASSESSMENT — PAIN DESCRIPTION - LOCATION
LOCATION: ABDOMEN

## 2024-05-24 ASSESSMENT — PAIN SCALES - GENERAL
PAINLEVEL_OUTOF10: 7
PAINLEVEL_OUTOF10: 5
PAINLEVEL_OUTOF10: 4
PAINLEVEL_OUTOF10: 9

## 2024-05-24 ASSESSMENT — PAIN DESCRIPTION - DESCRIPTORS: DESCRIPTORS: CRAMPING

## 2024-05-24 ASSESSMENT — PAIN DESCRIPTION - ORIENTATION: ORIENTATION: UPPER

## 2024-05-24 ASSESSMENT — PAIN - FUNCTIONAL ASSESSMENT: PAIN_FUNCTIONAL_ASSESSMENT: 0-10

## 2024-05-24 NOTE — PROGRESS NOTES
Went to see the patient for admission patient states she does not know what is wrong with her and she does not want admission informed EDP reports patient's decision and canceled admission.

## 2024-05-24 NOTE — ED TRIAGE NOTES
Pt arrived to ED [facility] w/t c/o abdominal pain that began last night. Pt states they have been nauseous and have hx type 2 diabetes. Pt states they have not been able to get their insulin this month.

## 2024-05-24 NOTE — ED NOTES
ED TO INPATIENT SBAR HANDOFF    Patient Name: Della Whiting   :  2000  23 y.o.   Preferred Name  Della   Family/Caregiver Present no   Restraints no   C-SSRS: Risk of Suicide: No Risk  Sitter no   Sepsis Risk Score Sepsis Risk Score: 0.41      Situation  Chief Complaint   Patient presents with    Abdominal Pain     Upper abd pain starting last night, reports nausea      Brief Description of Patient's Condition:   Pt arrived to ED w/t c/o abdominal pain and nausea. Pt states they have been out of insulin for past month. BG in 200s in ED. Pt a/o and ambulatory. Has hx pancreatitis. On room air.   Mental Status: oriented, alert, coherent, logical, thought processes intact, and able to concentrate and follow conversation  Arrived from: home    Imaging:   CT ABDOMEN PELVIS W IV CONTRAST Additional Contrast? None   Final Result      1.  Findings of acute interstitial edematous pancreatitis. No peripancreatic fluid collection.   2.  Severe hepatic steatosis.   3.  Hepatosplenomegaly.      Electronically signed by Gordon George      US ABDOMEN LIMITED   Final Result      1.  Hepatomegaly and increased echogenicity of the liver representing hepatic steatosis.      Electronically signed by Gordon George        Abnormal labs:   Abnormal Labs Reviewed   CBC WITH AUTO DIFFERENTIAL - Abnormal; Notable for the following components:       Result Value    MPV 12.7 (*)     Monocytes % 7.6 (*)     All other components within normal limits   COMPREHENSIVE METABOLIC PANEL - Abnormal; Notable for the following components:    Glucose 299 (*)     ALT 56 (*)     All other components within normal limits   LIPASE - Abnormal; Notable for the following components:    Lipase 2,664 (*)     All other components within normal limits   POCT GLUCOSE - Abnormal; Notable for the following components:    POC Glucose 291 (*)     All other components within normal limits        Background  History:   Past Medical History:   Diagnosis Date     ADHD (attention deficit hyperactivity disorder)     Diabetes mellitus (HCC)     Hyperlipidemia     Reflux        Assessment    Vitals: MEWS Score: 1  Level of Consciousness: Alert (0)   Vitals:    05/24/24 0921 05/24/24 1151   BP: 125/78 113/68   Pulse: 78 76   Resp: 15    Temp: 98.8 °F (37.1 °C)    TempSrc: Oral    SpO2: 97% 97%     PO Status:  O2 Flow Rate: O2 Device: None (Room air)    Cardiac Rhythm:   Last documented pain medication administered: See MAR   NIH Score: NIH     Active LDA's:   Peripheral IV 05/24/24 Left Antecubital (Active)       Pertinent or High Risk Medications/Drips: no   If Yes, please provide details:   Blood Product Administration: no  If Yes, please provide details:     Recommendation    Incomplete orders   Additional Comments:   If any further questions, please call Sending RN at [phone]     Electronically signed by: Electronically signed by Alliyah Schoenleben, RN on 5/24/2024 at 12:20 PM

## 2024-05-24 NOTE — ED PROVIDER NOTES
EMERGENCY DEPARTMENT ENCOUNTER        Pt Name: Della Whiting  MRN: [id number]  Birthdate 2000  Date of evaluation: 5/24/2024  Provider: DOUG JIMENEZ  PCP: Maximiliano Ayers MD    DEISY. I have evaluated this patient.        Triage CHIEF COMPLAINT       Chief Complaint   Patient presents with    Abdominal Pain     Upper abd pain starting last night, reports nausea          HISTORY OF PRESENT ILLNESS      Chief Complaint: Upper abdominal pain, nausea vomiting    Della Whiting is a 23 y.o.  female who presents to the ED via EMS with a chief complaint of upper abdominal pains, nausea vomiting, states that started last night and into the morning prompting her to call EMS to be evaluated.  Patient is a type I diabetic, she states has been out of her insulin medication for the last week.  She also states that she has a history of pancreatitis which is concerning to her.  She has pain throughout the epigastrium, no real radiation of symptoms.  Describes no chest pain shortness of breath lightheadedness or dizziness.  Has been nauseated and episode of vomiting, no hematemesis, no change in her bowel habits, denies urinary symptoms.  No history of intra-abdominal surgeries, no history of DKA.  Denies alcohol and/or drug use/abuse.    Nursing Notes were all reviewed and agreed with or any disagreements were addressed in the HPI.    REVIEW OF SYSTEMS     At least 10 systems reviewed and otherwise acutely negative except as in the [place]. 10    PAST MEDICAL HISTORY     Past Medical History:   Diagnosis Date    ADHD (attention deficit hyperactivity disorder)     Diabetes mellitus (HCC)     Hyperlipidemia     Reflux        SURGICAL HISTORY     Past Surgical History:   Procedure Laterality Date    TYMPANOSTOMY TUBE PLACEMENT      as a child       CURRENTMEDICATIONS       Discharge Medication List as of 5/24/2024  1:27 PM        CONTINUE these medications which have NOT CHANGED    Details   pantoprazole  RESULTS   LABS:    Labs Reviewed   CBC WITH AUTO DIFFERENTIAL - Abnormal; Notable for the following components:       Result Value    MPV 12.7 (*)     Monocytes % 7.6 (*)     All other components within normal limits   COMPREHENSIVE METABOLIC PANEL - Abnormal; Notable for the following components:    Glucose 299 (*)     ALT 56 (*)     All other components within normal limits   LIPASE - Abnormal; Notable for the following components:    Lipase 2,664 (*)     All other components within normal limits   POCT GLUCOSE - Abnormal; Notable for the following components:    POC Glucose 291 (*)     All other components within normal limits   HCG, SERUM, QUALITATIVE   URINALYSIS   POCT GLUCOSE       When ordered, only abnormal lab results are displayed. All other labs were within normal range or not returned as of this dictation.    EKG: When ordered, EKG's are interpreted by the Emergency Department Physician in the absence of a cardiologist.  Please see their note for interpretation of EKG.    RADIOLOGY:   Non-plain film images such as CT, Ultrasound and MRI are read by the radiologist. Plain radiographic images are visualized and preliminarily interpreted by the  ED Provider with the below findings:    Interpretation perthe Radiologist below, if available at the time of this note:    CT ABDOMEN PELVIS W IV CONTRAST Additional Contrast? None   Final Result      1.  Findings of acute interstitial edematous pancreatitis. No peripancreatic fluid collection.   2.  Severe hepatic steatosis.   3.  Hepatosplenomegaly.      Electronically signed by Gordon George      US ABDOMEN LIMITED   Final Result      1.  Hepatomegaly and increased echogenicity of the liver representing hepatic steatosis.      Electronically signed by Gordon George        No results found.      PROCEDURES   Unless otherwise noted below, none     CRITICAL CARE   CRITICAL CARE NOTE:   N/A    CONSULTS:  IP CONSULT TO GI      EMERGENCY DEPARTMENT COURSE and MDM: